# Patient Record
Sex: MALE | Race: WHITE | NOT HISPANIC OR LATINO | Employment: PART TIME | URBAN - METROPOLITAN AREA
[De-identification: names, ages, dates, MRNs, and addresses within clinical notes are randomized per-mention and may not be internally consistent; named-entity substitution may affect disease eponyms.]

---

## 2020-01-03 ENCOUNTER — HOSPITAL ENCOUNTER (INPATIENT)
Facility: CLINIC | Age: 25
LOS: 2 days | Discharge: HOME | End: 2020-01-05
Attending: EMERGENCY MEDICINE | Admitting: PSYCHIATRY & NEUROLOGY
Payer: COMMERCIAL

## 2020-01-03 DIAGNOSIS — F10.920 ACUTE ALCOHOLIC INTOXICATION WITHOUT COMPLICATION (H): ICD-10-CM

## 2020-01-03 DIAGNOSIS — J02.9 ACUTE PHARYNGITIS, UNSPECIFIED ETIOLOGY: ICD-10-CM

## 2020-01-03 PROBLEM — F10.939 ALCOHOL WITHDRAWAL SYNDROME, WITH UNSPECIFIED COMPLICATION (H): Status: ACTIVE | Noted: 2020-01-03

## 2020-01-03 LAB
ALBUMIN SERPL-MCNC: 4 G/DL (ref 3.4–5)
ALCOHOL BREATH TEST: 0.24 (ref 0–0.01)
ALP SERPL-CCNC: 74 U/L (ref 40–150)
ALT SERPL W P-5'-P-CCNC: 78 U/L (ref 0–70)
AMPHETAMINES UR QL SCN: NEGATIVE
ANION GAP SERPL CALCULATED.3IONS-SCNC: 11 MMOL/L (ref 3–14)
AST SERPL W P-5'-P-CCNC: 111 U/L (ref 0–45)
BARBITURATES UR QL: NEGATIVE
BASOPHILS # BLD AUTO: 0 10E9/L (ref 0–0.2)
BASOPHILS NFR BLD AUTO: 0.5 %
BENZODIAZ UR QL: NEGATIVE
BILIRUB SERPL-MCNC: 0.3 MG/DL (ref 0.2–1.3)
BUN SERPL-MCNC: 10 MG/DL (ref 7–30)
CALCIUM SERPL-MCNC: 8.2 MG/DL (ref 8.5–10.1)
CANNABINOIDS UR QL SCN: NEGATIVE
CHLORIDE SERPL-SCNC: 101 MMOL/L (ref 94–109)
CO2 SERPL-SCNC: 28 MMOL/L (ref 20–32)
COCAINE UR QL: NEGATIVE
CREAT SERPL-MCNC: 0.66 MG/DL (ref 0.66–1.25)
DEPRECATED S PYO AG THROAT QL EIA: NORMAL
DIFFERENTIAL METHOD BLD: ABNORMAL
EOSINOPHIL # BLD AUTO: 0 10E9/L (ref 0–0.7)
EOSINOPHIL NFR BLD AUTO: 0.3 %
ERYTHROCYTE [DISTWIDTH] IN BLOOD BY AUTOMATED COUNT: 14.4 % (ref 10–15)
ETHANOL UR QL SCN: POSITIVE
GFR SERPL CREATININE-BSD FRML MDRD: >90 ML/MIN/{1.73_M2}
GLUCOSE SERPL-MCNC: 172 MG/DL (ref 70–99)
HCT VFR BLD AUTO: 40.7 % (ref 40–53)
HGB BLD-MCNC: 13.2 G/DL (ref 13.3–17.7)
IMM GRANULOCYTES # BLD: 0 10E9/L (ref 0–0.4)
IMM GRANULOCYTES NFR BLD: 0 %
LYMPHOCYTES # BLD AUTO: 0.9 10E9/L (ref 0.8–5.3)
LYMPHOCYTES NFR BLD AUTO: 21.6 %
MCH RBC QN AUTO: 28.3 PG (ref 26.5–33)
MCHC RBC AUTO-ENTMCNC: 32.4 G/DL (ref 31.5–36.5)
MCV RBC AUTO: 87 FL (ref 78–100)
MONOCYTES # BLD AUTO: 0.4 10E9/L (ref 0–1.3)
MONOCYTES NFR BLD AUTO: 9 %
NEUTROPHILS # BLD AUTO: 2.7 10E9/L (ref 1.6–8.3)
NEUTROPHILS NFR BLD AUTO: 68.6 %
NRBC # BLD AUTO: 0 10*3/UL
NRBC BLD AUTO-RTO: 0 /100
OPIATES UR QL SCN: NEGATIVE
PLATELET # BLD AUTO: 157 10E9/L (ref 150–450)
POTASSIUM SERPL-SCNC: 3.6 MMOL/L (ref 3.4–5.3)
PROT SERPL-MCNC: 7.6 G/DL (ref 6.8–8.8)
RBC # BLD AUTO: 4.66 10E12/L (ref 4.4–5.9)
SODIUM SERPL-SCNC: 140 MMOL/L (ref 133–144)
SPECIMEN SOURCE: NORMAL
WBC # BLD AUTO: 4 10E9/L (ref 4–11)

## 2020-01-03 PROCEDURE — 87880 STREP A ASSAY W/OPTIC: CPT | Performed by: FAMILY MEDICINE

## 2020-01-03 PROCEDURE — 25000128 H RX IP 250 OP 636: Performed by: NURSE PRACTITIONER

## 2020-01-03 PROCEDURE — 80320 DRUG SCREEN QUANTALCOHOLS: CPT | Performed by: FAMILY MEDICINE

## 2020-01-03 PROCEDURE — 80307 DRUG TEST PRSMV CHEM ANLYZR: CPT | Performed by: FAMILY MEDICINE

## 2020-01-03 PROCEDURE — 85025 COMPLETE CBC W/AUTO DIFF WBC: CPT | Performed by: FAMILY MEDICINE

## 2020-01-03 PROCEDURE — 99282 EMERGENCY DEPT VISIT SF MDM: CPT | Mod: Z6 | Performed by: EMERGENCY MEDICINE

## 2020-01-03 PROCEDURE — 12800008 ZZH R&B CD ADULT

## 2020-01-03 PROCEDURE — 80053 COMPREHEN METABOLIC PANEL: CPT | Performed by: FAMILY MEDICINE

## 2020-01-03 PROCEDURE — HZ2ZZZZ DETOXIFICATION SERVICES FOR SUBSTANCE ABUSE TREATMENT: ICD-10-PCS | Performed by: EMERGENCY MEDICINE

## 2020-01-03 PROCEDURE — 25000132 ZZH RX MED GY IP 250 OP 250 PS 637: Performed by: EMERGENCY MEDICINE

## 2020-01-03 PROCEDURE — 87081 CULTURE SCREEN ONLY: CPT | Performed by: FAMILY MEDICINE

## 2020-01-03 PROCEDURE — 99283 EMERGENCY DEPT VISIT LOW MDM: CPT | Performed by: EMERGENCY MEDICINE

## 2020-01-03 PROCEDURE — 82075 ASSAY OF BREATH ETHANOL: CPT | Performed by: EMERGENCY MEDICINE

## 2020-01-03 PROCEDURE — 25000132 ZZH RX MED GY IP 250 OP 250 PS 637: Performed by: NURSE PRACTITIONER

## 2020-01-03 RX ORDER — FOLIC ACID 1 MG/1
1 TABLET ORAL DAILY
Status: DISCONTINUED | OUTPATIENT
Start: 2020-01-04 | End: 2020-01-05 | Stop reason: HOSPADM

## 2020-01-03 RX ORDER — LANOLIN ALCOHOL/MO/W.PET/CERES
100 CREAM (GRAM) TOPICAL DAILY
Status: DISCONTINUED | OUTPATIENT
Start: 2020-01-04 | End: 2020-01-05 | Stop reason: HOSPADM

## 2020-01-03 RX ORDER — ATENOLOL 50 MG/1
50 TABLET ORAL DAILY PRN
Status: DISCONTINUED | OUTPATIENT
Start: 2020-01-03 | End: 2020-01-05 | Stop reason: HOSPADM

## 2020-01-03 RX ORDER — TRAZODONE HYDROCHLORIDE 50 MG/1
50 TABLET, FILM COATED ORAL
Status: DISCONTINUED | OUTPATIENT
Start: 2020-01-03 | End: 2020-01-05 | Stop reason: HOSPADM

## 2020-01-03 RX ORDER — ONDANSETRON 4 MG/1
4 TABLET, ORALLY DISINTEGRATING ORAL EVERY 6 HOURS PRN
Status: DISCONTINUED | OUTPATIENT
Start: 2020-01-03 | End: 2020-01-05 | Stop reason: HOSPADM

## 2020-01-03 RX ORDER — ALUMINA, MAGNESIA, AND SIMETHICONE 2400; 2400; 240 MG/30ML; MG/30ML; MG/30ML
30 SUSPENSION ORAL EVERY 4 HOURS PRN
Status: DISCONTINUED | OUTPATIENT
Start: 2020-01-03 | End: 2020-01-05 | Stop reason: HOSPADM

## 2020-01-03 RX ORDER — BISACODYL 10 MG
10 SUPPOSITORY, RECTAL RECTAL DAILY PRN
Status: DISCONTINUED | OUTPATIENT
Start: 2020-01-03 | End: 2020-01-05 | Stop reason: HOSPADM

## 2020-01-03 RX ORDER — MULTIPLE VITAMINS W/ MINERALS TAB 9MG-400MCG
1 TAB ORAL DAILY
Status: DISCONTINUED | OUTPATIENT
Start: 2020-01-04 | End: 2020-01-05 | Stop reason: HOSPADM

## 2020-01-03 RX ORDER — IBUPROFEN 600 MG/1
600 TABLET, FILM COATED ORAL EVERY 6 HOURS PRN
Status: DISCONTINUED | OUTPATIENT
Start: 2020-01-03 | End: 2020-01-05 | Stop reason: HOSPADM

## 2020-01-03 RX ORDER — NICOTINE 21 MG/24HR
1 PATCH, TRANSDERMAL 24 HOURS TRANSDERMAL DAILY
Status: DISCONTINUED | OUTPATIENT
Start: 2020-01-04 | End: 2020-01-05 | Stop reason: HOSPADM

## 2020-01-03 RX ORDER — DIAZEPAM 5 MG
5-20 TABLET ORAL EVERY 30 MIN PRN
Status: DISCONTINUED | OUTPATIENT
Start: 2020-01-03 | End: 2020-01-05 | Stop reason: HOSPADM

## 2020-01-03 RX ORDER — NICOTINE 21 MG/24HR
1 PATCH, TRANSDERMAL 24 HOURS TRANSDERMAL ONCE
Status: COMPLETED | OUTPATIENT
Start: 2020-01-03 | End: 2020-01-04

## 2020-01-03 RX ORDER — HYDROXYZINE HYDROCHLORIDE 25 MG/1
25 TABLET, FILM COATED ORAL EVERY 4 HOURS PRN
Status: DISCONTINUED | OUTPATIENT
Start: 2020-01-03 | End: 2020-01-05 | Stop reason: HOSPADM

## 2020-01-03 RX ADMIN — TRAZODONE HYDROCHLORIDE 50 MG: 50 TABLET ORAL at 22:09

## 2020-01-03 RX ADMIN — NICOTINE 1 PATCH: 21 PATCH, EXTENDED RELEASE TRANSDERMAL at 16:38

## 2020-01-03 RX ADMIN — DIAZEPAM 10 MG: 5 TABLET ORAL at 20:43

## 2020-01-03 RX ADMIN — ONDANSETRON 4 MG: 4 TABLET, ORALLY DISINTEGRATING ORAL at 22:10

## 2020-01-03 RX ADMIN — HYDROXYZINE HYDROCHLORIDE 25 MG: 25 TABLET, FILM COATED ORAL at 19:01

## 2020-01-03 RX ADMIN — DIAZEPAM 10 MG: 5 TABLET ORAL at 19:01

## 2020-01-03 ASSESSMENT — ACTIVITIES OF DAILY LIVING (ADL)
FALL_HISTORY_WITHIN_LAST_SIX_MONTHS: YES
BATHING: 0-->INDEPENDENT
COGNITION: 0 - NO COGNITION ISSUES REPORTED
AMBULATION: 0-->INDEPENDENT
RETIRED_COMMUNICATION: 0-->UNDERSTANDS/COMMUNICATES WITHOUT DIFFICULTY
DRESS: 0-->INDEPENDENT
SWALLOWING: 0-->SWALLOWS FOODS/LIQUIDS WITHOUT DIFFICULTY
TOILETING: 0-->INDEPENDENT
NUMBER_OF_TIMES_PATIENT_HAS_FALLEN_WITHIN_LAST_SIX_MONTHS: 2
RETIRED_EATING: 0-->INDEPENDENT
TRANSFERRING: 0-->INDEPENDENT

## 2020-01-03 ASSESSMENT — MIFFLIN-ST. JEOR: SCORE: 1545.57

## 2020-01-03 NOTE — ED PROVIDER NOTES
History     Chief Complaint   Patient presents with     Withdrawal     seeking detox from alcohol     HPI  Manjeet Canales is a 24 year old male who presents for alcohol detox.  He last drank several hours prior to presentation.  He drinks 1 to 2 pints of liquor a day.  He has not had any previous professional detox but states in the past he tried to do this by himself at home.  He has been drinking for 6 months.  Denies any physical complaints at this time.    I have reviewed the Medications, Allergies, Past Medical and Surgical History, and Social History in the Epic system.    Review of Systems   All other systems reviewed and are negative.      Physical Exam   BP: 136/88  Pulse: 112  Temp: 98.2  F (36.8  C)  Resp: 14  Weight: 57.2 kg (126 lb)  SpO2: 98 %      Physical Exam  Constitutional:       Appearance: Normal appearance.   HENT:      Head: Normocephalic and atraumatic.      Mouth/Throat:      Mouth: Mucous membranes are moist.   Eyes:      Extraocular Movements: Extraocular movements intact.   Neck:      Musculoskeletal: Normal range of motion.   Cardiovascular:      Rate and Rhythm: Tachycardia present.   Pulmonary:      Effort: Pulmonary effort is normal.      Breath sounds: Normal breath sounds.   Abdominal:      General: Abdomen is flat.      Palpations: Abdomen is soft.      Tenderness: There is no abdominal tenderness.   Musculoskeletal: Normal range of motion.   Skin:     General: Skin is warm.      Capillary Refill: Capillary refill takes less than 2 seconds.   Neurological:      General: No focal deficit present.      Mental Status: He is alert and oriented to person, place, and time.         ED Course        Procedures             Labs Ordered and Resulted from Time of ED Arrival Up to the Time of Departure from the ED   DRUG ABUSE SCREEN 6 CHEM DEP URINE ([facility]) - Abnormal; Notable for the following components:       Result Value    Ethanol Qual Urine Positive (*)     All other  components within normal limits   CBC WITH PLATELETS DIFFERENTIAL - Abnormal; Notable for the following components:    Hemoglobin 13.2 (*)     All other components within normal limits   COMPREHENSIVE METABOLIC PANEL - Abnormal; Notable for the following components:    Glucose 172 (*)     Calcium 8.2 (*)     ALT 78 (*)      (*)     All other components within normal limits   ALCOHOL BREATH TEST POCT - Abnormal; Notable for the following components:    Alcohol Breath Test 0.242 (*)     All other components within normal limits            Assessments & Plan (with Medical Decision Making)   Patient presents for alcohol detox.  Currently stable.  There is a bed awaiting the patient.  Patient will be admitted for further treatment.  Medically stable.  Patient did complain of a sore throat so rapid strep was performed and this was negative.  Patient will be admitted for further alcohol detox treatment    I have reviewed the nursing notes.    I have reviewed the findings, diagnosis, plan and need for follow up with the patient.    New Prescriptions    No medications on file       Final diagnoses:   Acute alcoholic intoxication without complication (H)       1/3/2020   [facility], [place], EMERGENCY DEPARTMENT     Ivan Tenorio MD  01/03/20 0524

## 2020-01-03 NOTE — PHARMACY-ADMISSION MEDICATION HISTORY
Admission Medication History Completed by Pharmacy    Sources used to verify prior to admission medications:   - Patient interview    Pertinent Medication Changes:  No changes    Additional Information:   Patient denies being prescribed any medications or taking any over-the-counter (OTC) products such as vitamins, supplements, sleep aids, etc.      Prior to Admission Medication List:  None     Time spent: 5 minutes  -----------  Carrie Herbert PharmRupinderDRupinder, [facility]  Behavioral Health Pharmacist  [facility])  Ascom: *[id number] or [phone] (1pm to 9pm daily)

## 2020-01-04 LAB
GGT SERPL-CCNC: 80 U/L (ref 0–75)
MAGNESIUM SERPL-MCNC: 1.6 MG/DL (ref 1.6–2.3)
PHOSPHATE SERPL-MCNC: 4.7 MG/DL (ref 2.5–4.5)
TSH SERPL DL<=0.005 MIU/L-ACNC: 1.32 MU/L (ref 0.4–4)
VIT B12 SERPL-MCNC: 450 PG/ML (ref 193–986)

## 2020-01-04 PROCEDURE — 36415 COLL VENOUS BLD VENIPUNCTURE: CPT | Performed by: PSYCHIATRY & NEUROLOGY

## 2020-01-04 PROCEDURE — 25000132 ZZH RX MED GY IP 250 OP 250 PS 637: Performed by: NURSE PRACTITIONER

## 2020-01-04 PROCEDURE — 83735 ASSAY OF MAGNESIUM: CPT | Performed by: PSYCHIATRY & NEUROLOGY

## 2020-01-04 PROCEDURE — 99207 ZZC CDG-HISTORY COMP: MEETS EXP. PROBLEM FOCUSED - DOWN CODED LACK OF HPI: CPT | Performed by: PHYSICIAN ASSISTANT

## 2020-01-04 PROCEDURE — 82977 ASSAY OF GGT: CPT | Performed by: PSYCHIATRY & NEUROLOGY

## 2020-01-04 PROCEDURE — 84100 ASSAY OF PHOSPHORUS: CPT | Performed by: PSYCHIATRY & NEUROLOGY

## 2020-01-04 PROCEDURE — 99221 1ST HOSP IP/OBS SF/LOW 40: CPT | Mod: AI | Performed by: PSYCHIATRY & NEUROLOGY

## 2020-01-04 PROCEDURE — 84443 ASSAY THYROID STIM HORMONE: CPT | Performed by: PSYCHIATRY & NEUROLOGY

## 2020-01-04 PROCEDURE — 83735 ASSAY OF MAGNESIUM: CPT | Performed by: PHYSICIAN ASSISTANT

## 2020-01-04 PROCEDURE — 99207 ZZC CDG-HISTORY COMP: MEETS EXP. PROB FOCUSED- DOWN CODED LACK OF PFSH: CPT | Performed by: PSYCHIATRY & NEUROLOGY

## 2020-01-04 PROCEDURE — 12800008 ZZH R&B CD ADULT

## 2020-01-04 PROCEDURE — 82607 VITAMIN B-12: CPT | Performed by: PSYCHIATRY & NEUROLOGY

## 2020-01-04 PROCEDURE — 99231 SBSQ HOSP IP/OBS SF/LOW 25: CPT | Performed by: PHYSICIAN ASSISTANT

## 2020-01-04 RX ADMIN — IBUPROFEN 600 MG: 600 TABLET ORAL at 22:25

## 2020-01-04 RX ADMIN — DIAZEPAM 10 MG: 5 TABLET ORAL at 09:01

## 2020-01-04 RX ADMIN — MULTIPLE VITAMINS W/ MINERALS TAB 1 TABLET: TAB at 09:01

## 2020-01-04 RX ADMIN — MAGNESIUM HYDROXIDE 30 ML: 400 SUSPENSION ORAL at 14:08

## 2020-01-04 RX ADMIN — FOLIC ACID 1 MG: 1 TABLET ORAL at 09:01

## 2020-01-04 RX ADMIN — DIAZEPAM 10 MG: 5 TABLET ORAL at 00:55

## 2020-01-04 RX ADMIN — HYDROXYZINE HYDROCHLORIDE 25 MG: 25 TABLET, FILM COATED ORAL at 18:12

## 2020-01-04 RX ADMIN — DIAZEPAM 10 MG: 5 TABLET ORAL at 16:09

## 2020-01-04 RX ADMIN — THIAMINE HCL TAB 100 MG 100 MG: 100 TAB at 09:01

## 2020-01-04 RX ADMIN — TRAZODONE HYDROCHLORIDE 50 MG: 50 TABLET ORAL at 22:25

## 2020-01-04 RX ADMIN — DIAZEPAM 10 MG: 5 TABLET ORAL at 11:58

## 2020-01-04 RX ADMIN — NICOTINE 1 PATCH: 21 PATCH, EXTENDED RELEASE TRANSDERMAL at 09:01

## 2020-01-04 RX ADMIN — HYDROXYZINE HYDROCHLORIDE 25 MG: 25 TABLET, FILM COATED ORAL at 22:25

## 2020-01-04 ASSESSMENT — ACTIVITIES OF DAILY LIVING (ADL)
DRESS: INDEPENDENT
ORAL_HYGIENE: INDEPENDENT
HYGIENE/GROOMING: INDEPENDENT

## 2020-01-04 NOTE — CONSULTS
[facility], [place]  Consult Note - Hospitalist Service     Date of Admission:  1/3/2020  Consult Requested by: Karl Peña CNP  Reason for Consult: Medical co-management    Assessment & Plan   Manjeet Canales is a 24 year old male with a history of alcohol abuse and methamphetamine abuse who was admitted to station 3A seeking detoxification from alcohol.    Alcohol dependence: Drinking 1-2 pints per day. Last drink yesterday morning. Utox in the ED + for ethanol, SKIP 0.242.  No history of withdrawal seizures and DTs. MSSA currently 10.   - Continue on MSSA protocol with benzodiazepines as indicated   - Management per Psychiatry   - Agree with MVI, folic acid, and thiamine supplements   - Notify medicine for SBP >180 or DBP >110    Transaminitis: ALT 78, , alk phos and TBili wnl. Most c/w alcohol abuse. No prior labs available for comparison.   - He should have LFTs rechecked in 1-2 weeks with PCP    Sore throat: Much improved today. Rapid strep negative.    - Continue to monitor   - Notify Medicine for any worsening or new fever   - Ibuprofen JOCE Tompkins PA-C  [facility], [place]  Hospitalist Service  Pager: [phone]    ______________________________________________________________________    Chief Complaint   Alcohol withdrawal    History is obtained from the patient    History of Present Illness   Manjeet Canales is a 24 year old male with a history of alcohol abuse and methamphetamine abuse who was admitted to station 3A seeking detoxification from alcohol. He has been drinking about 1-2 pints of liquor daily for the last 6 months. Last drank yesterday morning. Smoking marijuana occasionally. No other substance use. No hx of withdrawal seizures. In 2/2019 he was admitted to OSH ICU for severe alcohol withdrawal with alcoholic hallucinosis requiring precedex gtt, also was intoxicated with  methamphetamines at the time.     Currently is feeling tired and tremulous. Is hoping to discharge tomorrow. Eating and drinking well. Has had a sore throat for a few days, is feeling better today. Denies any cough, nasal congestion, rhinorrhea, throat swelling, fever/chills, dyspnea. No dysphagia or throat swelling.     He is otherwise healthy with no ongoing medical problems. No history of cardiovascular or pulmonary disease. Is not diabetic.       Review of Systems   The 10 point Review of Systems is negative other than noted in the HPI or here.     Past Medical History    I have reviewed this patient's medical history and updated it with pertinent information if needed.   Past Medical History:   Diagnosis Date     Alcohol abuse        Past Surgical History   I have reviewed this patient's surgical history and updated it with pertinent information if needed.  History reviewed. No pertinent surgical history.    Social History   I have reviewed this patient's social history and updated it with pertinent information if needed.  Social History     Tobacco Use     Smoking status: Current Every Day Smoker     Smokeless tobacco: Never Used   Substance Use Topics     Alcohol use: Yes     Drug use: Never       Family History   I have reviewed this patient's family history and updated it with pertinent information if needed.   No family history on file.    Medications   Current Facility-Administered Medications   Medication     alum & mag hydroxide-simethicone (MYLANTA ES/MAALOX  ES) suspension 30 mL     atenolol (TENORMIN) tablet 50 mg     bisacodyl (DULCOLAX) Suppository 10 mg     diazepam (VALIUM) tablet 5-20 mg     folic acid (FOLVITE) tablet 1 mg     hydrOXYzine (ATARAX) tablet 25 mg     ibuprofen (ADVIL/MOTRIN) tablet 600 mg     influenza quadrivalent (PF) vacc (FLUZONE) injection 0.5 mL     magnesium hydroxide (MILK OF MAGNESIA) suspension 30 mL     multivitamin w/minerals (THERA-VIT-M) tablet 1 tablet     nicotine  (NICODERM CQ) 21 MG/24HR 24 hr patch 1 patch     nicotine (NICODERM CQ) 21 MG/24HR 24 hr patch 1 patch     nicotine Patch in Place     ondansetron (ZOFRAN-ODT) ODT tab 4 mg     traZODone (DESYREL) tablet 50 mg     vitamin B1 (THIAMINE) tablet 100 mg       Allergies   No Known Allergies    Physical Exam   Vital Signs: Temp: 97.4  F (36.3  C) Temp src: Oral BP: (!) 135/96 Pulse: 81   Resp: 16 SpO2: 98 % O2 Device: None (Room air)    Weight: 127 lbs 0 oz    Constitutional: Awake and alert, in no apparent distress.   Eyes: Sclera clear, anicteric   ENT: Oropharynx clear without exudates or erythema  Respiratory: Breathing comfortably on room air. Clear to auscultation bilaterally with no crackles, wheezing, or rhonchi. Good air entry throughout.   Cardiovascular:  RRR, normal S1/S2. No rubs or murmurs.   GI: Soft, non-tender, non-distended. Bowel sounds present.   Skin:  Good color. No jaundice. No visible rashes, lesions, or bruising of concern.   Neurologic: Alert and fully oriented. Fine resting tremor of bilateral UEs. No focal deficits.     Data   ROUTINE IP LABS (Last four results)  Recent Labs   Lab 01/03/20  1244      POTASSIUM 3.6   CHLORIDE 101   CO2 28   ANIONGAP 11   *   BUN 10   CR 0.66   SULEMA 8.2*   PROTTOTAL 7.6   ALBUMIN 4.0   BILITOTAL 0.3   ALKPHOS 74   *   ALT 78*     Recent Labs   Lab 01/03/20  1244   WBC 4.0   RBC 4.66   HGB 13.2*   HCT 40.7   MCV 87   MCH 28.3   MCHC 32.4   RDW 14.4        No lab results found in last 7 days.

## 2020-01-04 NOTE — PROGRESS NOTES
01/03/20 1823   Patient Belongings   Did you bring any home meds/supplements to the hospital?  No   Patient Belongings other (see comments)   Patient Belongings Put in Hospital Secure Location (Security or Locker, etc.) other (see comments)   Belongings Search Yes   Clothing Search Yes   Second Staff Dion   Comment See Notes     Large Bin   Jacket, backpack w/shorts and sweat pants w/strings, sharps(E cig,keys), boots belt, winter hat, scalf  Small bin:  Wallet, phone  Security env # [id number]    Visa card-[id number]     Mn Id   A               Admission:  I am responsible for any personal items that are not sent to the safe or pharmacy.  [facility] is not responsible for loss, theft or damage of any property in my possession.    Signature:  _________________________________ Date: _______  Time: _____                                              Staff Signature:  ____________________________ Date: ________  Time: _____      2nd Staff person, if patient is unable/unwilling to sign:    Signature: ________________________________ Date: ________  Time: _____     Discharge:  [facility] has returned all of my personal belongings:    Signature: _________________________________ Date: ________  Time: _____                                          Staff Signature:  ____________________________ Date: ________  Time: _____

## 2020-01-05 VITALS
HEIGHT: 68 IN | TEMPERATURE: 98.2 F | RESPIRATION RATE: 16 BRPM | HEART RATE: 96 BPM | WEIGHT: 127 LBS | OXYGEN SATURATION: 100 % | BODY MASS INDEX: 19.25 KG/M2 | SYSTOLIC BLOOD PRESSURE: 145 MMHG | DIASTOLIC BLOOD PRESSURE: 89 MMHG

## 2020-01-05 LAB
BACTERIA SPEC CULT: NORMAL
Lab: NORMAL
SPECIMEN SOURCE: NORMAL

## 2020-01-05 PROCEDURE — 25000128 H RX IP 250 OP 636: Performed by: PSYCHIATRY & NEUROLOGY

## 2020-01-05 PROCEDURE — 90686 IIV4 VACC NO PRSV 0.5 ML IM: CPT | Performed by: PSYCHIATRY & NEUROLOGY

## 2020-01-05 PROCEDURE — 25000132 ZZH RX MED GY IP 250 OP 250 PS 637: Performed by: NURSE PRACTITIONER

## 2020-01-05 RX ADMIN — IBUPROFEN 600 MG: 600 TABLET ORAL at 10:24

## 2020-01-05 RX ADMIN — INFLUENZA A VIRUS A/BRISBANE/02/2018 IVR-190 (H1N1) ANTIGEN (FORMALDEHYDE INACTIVATED), INFLUENZA A VIRUS A/KANSAS/14/2017 X-327 (H3N2) ANTIGEN (FORMALDEHYDE INACTIVATED), INFLUENZA B VIRUS B/PHUKET/3073/2013 ANTIGEN (FORMALDEHYDE INACTIVATED), AND INFLUENZA B VIRUS B/MARYLAND/15/2016 BX-69A ANTIGEN (FORMALDEHYDE INACTIVATED) 0.5 ML: 15; 15; 15; 15 INJECTION, SUSPENSION INTRAMUSCULAR at 12:25

## 2020-01-05 RX ADMIN — MULTIPLE VITAMINS W/ MINERALS TAB 1 TABLET: TAB at 08:16

## 2020-01-05 RX ADMIN — NICOTINE 1 PATCH: 21 PATCH, EXTENDED RELEASE TRANSDERMAL at 08:18

## 2020-01-05 RX ADMIN — FOLIC ACID 1 MG: 1 TABLET ORAL at 08:16

## 2020-01-05 RX ADMIN — THIAMINE HCL TAB 100 MG 100 MG: 100 TAB at 08:16

## 2020-01-05 NOTE — DISCHARGE INSTRUCTIONS
Behavioral Discharge Planning and Instructions  THANK YOU FOR CHOOSING THE [facility]  3A  [phone]    Summary: You were admitted to Station 3A on 1/3/2020 for detoxification from alcohol.  A medical exam was performed that included lab work.  Please take care and make your recovery a priority!    Main Diagnosis: Alcohol use disorder    Disposition: home    Major Treatments, Procedures and Findings:  You were detoxed from alcohol  using [facility] protocol.  You have had labs drawn and those results have been reviewed with you. Please take a copy of your lab work with you to your next primary care physician appointment.    Primary Provider:  Please follow up with primary MD  within 30 days for post hospitalization checkup.    Symptoms to Report:  If you experience more anxiety, confusion, sleeplessness, deep sadness or thoughts of suicide, notify your treatment team or notify your primary care physician. IF ANY OF THE SYMPTOMS YOU ARE EXPERIENCING ARE A MEDICAL EMERGENCY CALL 911 IMMEDIATELY.     Lifestyle Adjustment: Adjust your lifestyle to get enough sleep, relaxation, exercise and  good nutrition. Continue to develop healthy coping skills to decrease stress and promote a sober living environment. Do not use alcohol, illegal drugs or addictive medications other than what is currently prescribed. AA, NA, and  Sponsor are excellent resources for support.     General Medication Instructions:   See your medication sheet(s) for instructions.   Take all medicines as directed.  Make no changes unless your doctor suggests them.   Do not use any drugs not prescribed by your provider.    Avoid alcohol.    DISCHARGE RESOURCES:  -SMART Recovery - self management for addiction recovery:  www.smartrecovery.org    -Pathways ~ A Health Crisis Resource & Support Center: 359.346.5202.  -[facility] 546-774-7838   -[facility] Behavioral Intake 642-886-6796 or  378.998.8500.  -Suicide Awareness Voices of Education (SAVE) (www.save.org): 131-350-VXJY (1160)  -National Suicide Prevention Line (www.mentalhealthmn.org): 376-857-UHBX (7265)  -National [place] on Mental Illness (www.mn.kaya.org): 302.793.8462 or 851-199-8257.  -Pjda8lgru: text the word LIFE to 92895 for immediate support and crisis intervention  -Mental Health Consumer/Survivor Network of [place] (www.mhcsn.net): 306.295.2150 or 767-925-6060  -Mental Health Association of [place] (www.mentalhealth.org): 592.855.9281 or 632-327-8363     -Substance Abuse and Mental Health Services (www.samhsa.gov)  -Harm Reduction Coalition (www. Harmreduction.org)  -www.prescribetoprevent.org or http://prescribetoprevent.org/video  -Poison control 5-387-185-8494   **[place] Opioid Prevention Coalition: www.opioidcoalition.org    Sober Support Group Information:  AA/NA & Sponsor/Support  -Alcoholics Anonymous (www.alcoholics-anonymous.org): for local information 24 hours/day  -AA Intergroup service office in [place] (http://www.aastpaul.org/) 568.584.7229  -AA Intergroup service office in [facility]: 671.980.3248. (http://www.a[place].org/)  -Narcotics Anonymous (www.nam[place].org) (901) 102-4834   **Sober Fun Activities: www.sober-activities.[place] Panels/cities//[facility] Recovery Connection (MRC)  [facility] connects people seeking recovery to resources that help foster and sustain long-term recovery.  Whether you are seeking resources for treatment, transportation, housing, job training, education, health care or other pathways to recovery, [facility] is a great place to start.    Phone: 632.897.9026.  www.[place]OneEyeAnt.NuMedii (Great listing of all types of recovery and non-recovery related resources)    Any follow up concerns:  Nursing questions call the Unit 3A-[facility] [phone]  Medical Record call [phone]  Outpatient Behavioral Intake call [phone]  LP+ Wait List/Bed Availability call  [phone]    The entire treatment team has appreciated the opportunity to work with you.  We wish you the best in the future and with your lifelong recovery goals. Please bring this discharge folder with you to all follow up appointments.  It contains your lab results, diagnosis, medication list and discharge recommendations.    THANK YOU FOR CHOOSING THE [facility]

## 2020-01-05 NOTE — PROGRESS NOTES
Pt verbalized complete understanding of all discharge instructions. Pt discharged to home transported by his mother.

## 2020-01-05 NOTE — PROGRESS NOTES
Pt has been asking if/when he can leave at each check-in. Pt has been informed the provider makes the decision on discharge. Pt is aware of needing to be OOD for hours.     Pt MSSA =  9, 5... Pt did not receive valium at 20:00 check-in and immediately asked if he will be leaving Sunday night.

## 2020-01-05 NOTE — H&P
[facility], [place]   Detox Admit   Admission date: 1/3/2020  Seen on: 1/4/2020            HPI:     Manjeet is a 24 year old male with history of alcohol use disorder who is admitted to detox from alcohol. He mentions that he drinks about 1-2 pints of vodka per day for the past 6 months. He's tried to detox himself at home, but unsuccessfully, and mentions this is his first detox admit. BAL was 0.242 on admission. Denies w/o seizures. Currently has tremor, nausea, mild dizziness. Is open to CD treatment going forward.         Past Psychiatric History:   Denies past psych history         Substance Use and History:     Recurrent alcohol use, denies other illicit drugs. Never been to detox before. Utox positive only for ETOH.         Past Medical History:   PAST MEDICAL HISTORY:   Past Medical History:   Diagnosis Date     Alcohol abuse        PAST SURGICAL HISTORY: History reviewed. No pertinent surgical history.          Family History:   FAMILY HISTORY: No family history on file.        Social History:   SOCIAL HISTORY:   Social History     Tobacco Use     Smoking status: Current Every Day Smoker     Smokeless tobacco: Never Used   Substance Use Topics     Alcohol use: Yes            Physical ROS:   The patient endorsed tremor, nausea, mild dizziness. The remainder of 10-point review of systems was negative except as noted in HPI.         PTA Medications:     No medications prior to admission.          Allergies:   No Known Allergies       Labs:     Recent Results (from the past 48 hour(s))   Alcohol breath test POCT    Collection Time: 01/03/20 12:11 PM   Result Value Ref Range    Alcohol Breath Test 0.242 (A) 0.00 - 0.01   Drug abuse screen 6 urine (tox)    Collection Time: 01/03/20 12:25 PM   Result Value Ref Range    Amphetamine Qual Urine Negative NEG^Negative    Barbiturates Qual Urine Negative NEG^Negative    Benzodiazepine Qual Urine Negative NEG^Negative    Cannabinoids Qual  Urine Negative NEG^Negative    Cocaine Qual Urine Negative NEG^Negative    Ethanol Qual Urine Positive (A) NEG^Negative    Opiates Qualitative Urine Negative NEG^Negative   CBC with platelets differential    Collection Time: 01/03/20 12:44 PM   Result Value Ref Range    WBC 4.0 4.0 - 11.0 10e9/L    RBC Count 4.66 4.4 - 5.9 10e12/L    Hemoglobin 13.2 (L) 13.3 - 17.7 g/dL    Hematocrit 40.7 40.0 - 53.0 %    MCV 87 78 - 100 fl    MCH 28.3 26.5 - 33.0 pg    MCHC 32.4 31.5 - 36.5 g/dL    RDW 14.4 10.0 - 15.0 %    Platelet Count 157 150 - 450 10e9/L    Diff Method Automated Method     % Neutrophils 68.6 %    % Lymphocytes 21.6 %    % Monocytes 9.0 %    % Eosinophils 0.3 %    % Basophils 0.5 %    % Immature Granulocytes 0.0 %    Nucleated RBCs 0 0 /100    Absolute Neutrophil 2.7 1.6 - 8.3 10e9/L    Absolute Lymphocytes 0.9 0.8 - 5.3 10e9/L    Absolute Monocytes 0.4 0.0 - 1.3 10e9/L    Absolute Eosinophils 0.0 0.0 - 0.7 10e9/L    Absolute Basophils 0.0 0.0 - 0.2 10e9/L    Abs Immature Granulocytes 0.0 0 - 0.4 10e9/L    Absolute Nucleated RBC 0.0    Comprehensive metabolic panel    Collection Time: 01/03/20 12:44 PM   Result Value Ref Range    Sodium 140 133 - 144 mmol/L    Potassium 3.6 3.4 - 5.3 mmol/L    Chloride 101 94 - 109 mmol/L    Carbon Dioxide 28 20 - 32 mmol/L    Anion Gap 11 3 - 14 mmol/L    Glucose 172 (H) 70 - 99 mg/dL    Urea Nitrogen 10 7 - 30 mg/dL    Creatinine 0.66 0.66 - 1.25 mg/dL    GFR Estimate >90 >60 mL/min/[1.73_m2]    GFR Estimate If Black >90 >60 mL/min/[1.73_m2]    Calcium 8.2 (L) 8.5 - 10.1 mg/dL    Bilirubin Total 0.3 0.2 - 1.3 mg/dL    Albumin 4.0 3.4 - 5.0 g/dL    Protein Total 7.6 6.8 - 8.8 g/dL    Alkaline Phosphatase 74 40 - 150 U/L    ALT 78 (H) 0 - 70 U/L     (H) 0 - 45 U/L   Rapid strep screen    Collection Time: 01/03/20  3:45 PM   Result Value Ref Range    Specimen Description Throat     Rapid Strep A Screen       NEGATIVE: No Group A streptococcal antigen detected by  "immunoassay, await culture report.   Beta strep group A culture    Collection Time: 01/03/20  3:45 PM   Result Value Ref Range    Specimen Description Throat     Special Requests Specimen collected in eSwab transport (white cap)     Culture Micro       Negative after 24 hours, await final report at 48 hours.   GGT    Collection Time: 01/04/20  7:40 AM   Result Value Ref Range    GGT 80 (H) 0 - 75 U/L   TSH with free T4 reflex and/or T3 as indicated    Collection Time: 01/04/20  7:40 AM   Result Value Ref Range    TSH 1.32 0.40 - 4.00 mU/L   Vitamin B12    Collection Time: 01/04/20  7:40 AM   Result Value Ref Range    Vitamin B12 450 193 - 986 pg/mL   Magnesium    Collection Time: 01/04/20  7:40 AM   Result Value Ref Range    Magnesium 1.6 1.6 - 2.3 mg/dL   Phosphorus    Collection Time: 01/04/20  7:40 AM   Result Value Ref Range    Phosphorus 4.7 (H) 2.5 - 4.5 mg/dL          Physical and Psychiatric Examination:     /89   Pulse 99   Temp 97.5  F (36.4  C) (Oral)   Resp 16   Ht 1.727 m (5' 8\")   Wt 57.6 kg (127 lb)   SpO2 96%   BMI 19.31 kg/m    Weight is 127 lbs 0 oz  Body mass index is 19.31 kg/m .    Physical Exam:  I have reviewed the physical exam as documented by ED provider and agree with findings and assessment and have no additional findings to add at this time.    Mental Status Exam:  Appearance: awake, alert and dressed in hospital scrubs  Attitude:  cooperative  Eye Contact:  fair  Mood:  anxious  Affect:  appropriate and in normal range  Speech:  clear, coherent  Language: fluent and intact in English  Psychomotor, Gait, Musculoskeletal:  tremor observed   Throught Process:  logical, linear and goal oriented  Associations:  no loose associations  Thought Content:  no evidence of suicidal ideation or homicidal ideation, no evidence of psychotic thought, no auditory hallucinations present and no visual hallucinations present  Insight:  fair  Judgement:  fair  Oriented to:  time, person, and " place  Attention Span and Concentration:  fair  Recent and Remote Memory:  fair  Fund of Knowledge:  appropriate         Admission Diagnoses:   Alcohol Use Disorder, sever, complicated by withdrawal          Assessment & Plan:     Assessment:  Patient appears to have continued ETOH withdrawal receiving Valium as per protocol. Hopeful to go to CD treatment going forward.     Plan:  1.) Medication Plan:  - MSSA with Valium protocol    2.) Psychosocial Plan:  - CD assessment/treatment going forward     Legal:  Voluntary     Disposition:  Pending stability        Dave Cerna MD  [facility] Psychiatry

## 2020-01-19 NOTE — DISCHARGE SUMMARY
[facility]   Psychiatric Discharge Summary      Manjeet Canales MRN# [id number]   Age: 24 year old YOB: 1995     Date of Admission:  1/3/2020  Date of Discharge:  01/05/20  Admitting Physician:  Dave Cerna MD   Discharge Physician:  Dave Cerna MD         Summary/Hospital Course/Disposition:   Reason for Hospitalization: Manjeet is a 24 year old male with history of alcohol use disorder who is admitted to detox from alcohol. He mentions that he drinks about 1-2 pints of vodka per day for the past 6 months. He's tried to detox himself at home, but unsuccessfully, and mentions this is his first detox admit. BAL was 0.242 on admission. Denies w/o seizures. Currently has tremor, nausea, mild dizziness. Is open to CD treatment going forward.       Hospital Course:   Patient was treated for ETOH withdrawal via MSSA with Valium protocol. Withdrawal symptoms gradually abated. Patient was discharged.          DIagnoses:     Alcohol Use Disorder, sever, complicated by withdrawal        Labs:   No results found for this or any previous visit (from the past 24 hour(s)).         Consults:   [facility]  Consult Note - Hospitalist Service     Date of Admission:  1/3/2020  Consult Requested by: Karl Peña CNP  Reason for Consult: Medical co-management        Assessment & Plan     Manjeet Canales is a 24 year old male with a history of alcohol abuse and methamphetamine abuse who was admitted to station 3A seeking detoxification from alcohol.     Alcohol dependence: Drinking 1-2 pints per day. Last drink yesterday morning. Utox in the ED + for ethanol, SKIP 0.242.  No history of withdrawal seizures and DTs. MSSA currently 10.   - Continue on MSSA protocol with benzodiazepines as indicated   - Management per Psychiatry   - Agree with MVI, folic acid, and thiamine supplements   - Notify medicine for SBP >180  or DBP >110     Transaminitis: ALT 78, , alk phos and TBili wnl. Most c/w alcohol abuse. No prior labs available for comparison.   - He should have LFTs rechecked in 1-2 weeks with PCP     Sore throat: Much improved today. Rapid strep negative.    - Continue to monitor   - Notify Medicine for any worsening or new fever   - Ibuprofen PRN           Jamaica Tompkins PA-C  [facility]  Hospitalist Service  Pager: [phone]           Discharge Medications:        Review of your medicines      You have not been prescribed any medications.              Mental Status Examination:   Appearance: awake, alert and dressed in hospital scrubs  Attitude:  cooperative  Eye Contact:  fair  Mood:  anxious  Affect:  appropriate and in normal range  Speech:  clear, coherent  Language: fluent and intact in English  Psychomotor, Gait, Musculoskeletal: No tremor today   Throught Process:  logical, linear and goal oriented  Associations:  no loose associations  Thought Content:  no evidence of suicidal ideation or homicidal ideation, no evidence of psychotic thought, no auditory hallucinations present and no visual hallucinations present  Insight:  fair  Judgement:  fair  Oriented to:  time, person, and place  Attention Span and Concentration:  fair  Recent and Remote Memory:  fair  Fund of Knowledge:  appropriate         Discharge Plan:        Follow Up ([facility])    Follow-up with primary care provider in 1-2 weeks for recheck of liver function.       DISCHARGE RESOURCES:  -SMART Recovery - self management for addiction recovery:  www.smartrecovery.org    -Pathways ~ A Health Crisis Resource & Support Center: 963.740.8442.  -[facility] [phone]   -[facility] Behavioral Intake 355-301-7968 or 559-748-7241.  -Suicide Awareness Voices of Education (SAVE) (www.save.org): 572-487-KOQS (1591)  -National Suicide Prevention Line (www.mentalhealthmn.org):  711-823-TVPN (4372)  -National [place] on Mental Illness (www.mn.kaya.org): 523.612.4903 or 072-321-2584.  -Uevq3qlnw: text the word LIFE to 26790 for immediate support and crisis intervention  -Mental Health Consumer/Survivor Network of [place] (www.mhcsn.net): 827.563.2568 or 316-237-4196  -Mental Health Association of [place] (www.mentalhealth.org): 954.899.6774 or 091-031-5329     -Substance Abuse and Mental Health Services (www.samhsa.gov)  -Harm Reduction Coalition (www. Harmreduction.org)  -www.prescribetoprevent.org or http://prescribetoprevent.org/video  -Poison control 1-417-200-7301   **[place] Opioid Prevention Coalition: www.opioidcoalition.org     Sober Support Group Information:  AA/NA & Sponsor/Support  -Alcoholics Anonymous (www.alcoholics-anonymous.org): for local information 24 hours/day  -AA Intergroup service office in [place] (http://www.aastpaul.org/) 351.902.9522  -AA Intergroup service office in [facility]: 565.180.2104. (http://www.a[place].org/)  -Narcotics Anonymous (www.nam[place].org) (533) 257-6879   **Sober Fun Activities: www.soberInternational Electronics Exchangeactivities.[employer].vBrand/[facility]//[facility] Recovery Connection (MRC)  [facility] connects people seeking recovery to resources that help foster and sustain long-term recovery.  Whether you are seeking resources for treatment, transportation, housing, job training, education, health care or other pathways to recovery, [facility] is a great place to start.    Phone: 553.607.5437.  www.[place]"[employer].CoachClub (Great listing of all types of recovery and non-recovery related resources)       Dave Cerna MD   [facility] Psychiatry

## 2021-02-01 NOTE — TELEPHONE ENCOUNTER
DIAGNOSIS: low back pain, possible disc herniation / self referred / Counts include 234 beds at the [facility] / no surgeries or imaging   APPOINTMENT DATE: 2/5/21   NOTES STATUS DETAILS   OFFICE NOTE from referring provider N/A    OFFICE NOTE from other specialist N/A    DISCHARGE SUMMARY from hospital N/A    DISCHARGE REPORT from the ER N/A    OPERATIVE REPORT N/A    EMG report N/A    MEDICATION LIST N/A    MRI N/A    DEXA (osteoporosis/bone health) N/A    CT SCAN N/A    XRAYS (IMAGES & REPORTS) N/A

## 2021-02-05 ENCOUNTER — PRE VISIT (OUTPATIENT)
Dept: ORTHOPEDICS | Facility: CLINIC | Age: 26
End: 2021-02-05

## 2021-02-12 DIAGNOSIS — M54.50 LOW BACK PAIN: Primary | ICD-10-CM

## 2021-02-16 ENCOUNTER — HOSPITAL ENCOUNTER (INPATIENT)
Facility: CLINIC | Age: 26
LOS: 1 days | Discharge: HOME | End: 2021-02-18
Attending: EMERGENCY MEDICINE | Admitting: PSYCHIATRY & NEUROLOGY
Payer: COMMERCIAL

## 2021-02-16 DIAGNOSIS — F17.200 TOBACCO USE DISORDER: ICD-10-CM

## 2021-02-16 DIAGNOSIS — E55.9 VITAMIN D DEFICIENCY: ICD-10-CM

## 2021-02-16 DIAGNOSIS — Z11.52 ENCOUNTER FOR SCREENING LABORATORY TESTING FOR SEVERE ACUTE RESPIRATORY SYNDROME CORONAVIRUS 2 (SARS-COV-2): ICD-10-CM

## 2021-02-16 DIAGNOSIS — F10.939 ALCOHOL WITHDRAWAL SYNDROME, WITH UNSPECIFIED COMPLICATION (H): ICD-10-CM

## 2021-02-16 DIAGNOSIS — F41.1 GAD (GENERALIZED ANXIETY DISORDER): Primary | ICD-10-CM

## 2021-02-16 DIAGNOSIS — F10.10 ALCOHOL ABUSE: ICD-10-CM

## 2021-02-16 LAB
ALBUMIN SERPL-MCNC: 4.4 G/DL (ref 3.4–5)
ALCOHOL BREATH TEST: 0.12 (ref 0–0.01)
ALP SERPL-CCNC: 58 U/L (ref 40–150)
ALT SERPL W P-5'-P-CCNC: 20 U/L (ref 0–70)
AMPHETAMINES UR QL SCN: POSITIVE
ANION GAP SERPL CALCULATED.3IONS-SCNC: 10 MMOL/L (ref 3–14)
AST SERPL W P-5'-P-CCNC: 38 U/L (ref 0–45)
BARBITURATES UR QL: NEGATIVE
BASOPHILS # BLD AUTO: 0 10E9/L (ref 0–0.2)
BASOPHILS NFR BLD AUTO: 0.6 %
BENZODIAZ UR QL: NEGATIVE
BILIRUB SERPL-MCNC: 0.4 MG/DL (ref 0.2–1.3)
BUN SERPL-MCNC: 7 MG/DL (ref 7–30)
CALCIUM SERPL-MCNC: 8.5 MG/DL (ref 8.5–10.1)
CANNABINOIDS UR QL SCN: NEGATIVE
CHLORIDE SERPL-SCNC: 100 MMOL/L (ref 94–109)
CO2 SERPL-SCNC: 28 MMOL/L (ref 20–32)
COCAINE UR QL: POSITIVE
CREAT SERPL-MCNC: 0.88 MG/DL (ref 0.66–1.25)
DIFFERENTIAL METHOD BLD: ABNORMAL
EOSINOPHIL # BLD AUTO: 0 10E9/L (ref 0–0.7)
EOSINOPHIL NFR BLD AUTO: 0.3 %
ERYTHROCYTE [DISTWIDTH] IN BLOOD BY AUTOMATED COUNT: 12.9 % (ref 10–15)
ETHANOL UR QL SCN: POSITIVE
GFR SERPL CREATININE-BSD FRML MDRD: >90 ML/MIN/{1.73_M2}
GLUCOSE SERPL-MCNC: 81 MG/DL (ref 70–99)
HCT VFR BLD AUTO: 42.6 % (ref 40–53)
HGB BLD-MCNC: 14.1 G/DL (ref 13.3–17.7)
IMM GRANULOCYTES # BLD: 0 10E9/L (ref 0–0.4)
IMM GRANULOCYTES NFR BLD: 0 %
LABORATORY COMMENT REPORT: NORMAL
LYMPHOCYTES # BLD AUTO: 1.4 10E9/L (ref 0.8–5.3)
LYMPHOCYTES NFR BLD AUTO: 38.2 %
MCH RBC QN AUTO: 28 PG (ref 26.5–33)
MCHC RBC AUTO-ENTMCNC: 33.1 G/DL (ref 31.5–36.5)
MCV RBC AUTO: 85 FL (ref 78–100)
MONOCYTES # BLD AUTO: 0.3 10E9/L (ref 0–1.3)
MONOCYTES NFR BLD AUTO: 9.1 %
NEUTROPHILS # BLD AUTO: 1.9 10E9/L (ref 1.6–8.3)
NEUTROPHILS NFR BLD AUTO: 51.8 %
NRBC # BLD AUTO: 0 10*3/UL
NRBC BLD AUTO-RTO: 0 /100
OPIATES UR QL SCN: NEGATIVE
PLATELET # BLD AUTO: 183 10E9/L (ref 150–450)
POTASSIUM SERPL-SCNC: 3.3 MMOL/L (ref 3.4–5.3)
PROT SERPL-MCNC: 8 G/DL (ref 6.8–8.8)
RBC # BLD AUTO: 5.04 10E12/L (ref 4.4–5.9)
SARS-COV-2 RNA RESP QL NAA+PROBE: NEGATIVE
SODIUM SERPL-SCNC: 138 MMOL/L (ref 133–144)
SPECIMEN SOURCE: NORMAL
WBC # BLD AUTO: 3.6 10E9/L (ref 4–11)

## 2021-02-16 PROCEDURE — 80307 DRUG TEST PRSMV CHEM ANLYZR: CPT | Performed by: EMERGENCY MEDICINE

## 2021-02-16 PROCEDURE — C9803 HOPD COVID-19 SPEC COLLECT: HCPCS | Performed by: EMERGENCY MEDICINE

## 2021-02-16 PROCEDURE — 250N000013 HC RX MED GY IP 250 OP 250 PS 637: Performed by: EMERGENCY MEDICINE

## 2021-02-16 PROCEDURE — 84443 ASSAY THYROID STIM HORMONE: CPT | Performed by: EMERGENCY MEDICINE

## 2021-02-16 PROCEDURE — 99284 EMERGENCY DEPT VISIT MOD MDM: CPT | Performed by: EMERGENCY MEDICINE

## 2021-02-16 PROCEDURE — U0003 INFECTIOUS AGENT DETECTION BY NUCLEIC ACID (DNA OR RNA); SEVERE ACUTE RESPIRATORY SYNDROME CORONAVIRUS 2 (SARS-COV-2) (CORONAVIRUS DISEASE [COVID-19]), AMPLIFIED PROBE TECHNIQUE, MAKING USE OF HIGH THROUGHPUT TECHNOLOGIES AS DESCRIBED BY CMS-2020-01-R: HCPCS | Performed by: EMERGENCY MEDICINE

## 2021-02-16 PROCEDURE — 80053 COMPREHEN METABOLIC PANEL: CPT | Performed by: EMERGENCY MEDICINE

## 2021-02-16 PROCEDURE — 85025 COMPLETE CBC W/AUTO DIFF WBC: CPT | Performed by: EMERGENCY MEDICINE

## 2021-02-16 PROCEDURE — U0005 INFEC AGEN DETEC AMPLI PROBE: HCPCS | Performed by: EMERGENCY MEDICINE

## 2021-02-16 PROCEDURE — 82977 ASSAY OF GGT: CPT | Performed by: EMERGENCY MEDICINE

## 2021-02-16 PROCEDURE — 82607 VITAMIN B-12: CPT | Performed by: EMERGENCY MEDICINE

## 2021-02-16 PROCEDURE — 80320 DRUG SCREEN QUANTALCOHOLS: CPT | Performed by: EMERGENCY MEDICINE

## 2021-02-16 PROCEDURE — 99285 EMERGENCY DEPT VISIT HI MDM: CPT | Performed by: EMERGENCY MEDICINE

## 2021-02-16 PROCEDURE — 82306 VITAMIN D 25 HYDROXY: CPT | Performed by: EMERGENCY MEDICINE

## 2021-02-16 RX ORDER — DIAZEPAM 5 MG
5-20 TABLET ORAL EVERY 30 MIN PRN
Status: DISCONTINUED | OUTPATIENT
Start: 2021-02-16 | End: 2021-02-17

## 2021-02-16 RX ORDER — NICOTINE 21 MG/24HR
1 PATCH, TRANSDERMAL 24 HOURS TRANSDERMAL ONCE
Status: DISCONTINUED | OUTPATIENT
Start: 2021-02-16 | End: 2021-02-17

## 2021-02-16 RX ADMIN — DIAZEPAM 5 MG: 5 TABLET ORAL at 22:21

## 2021-02-16 RX ADMIN — NICOTINE 1 PATCH: 21 PATCH, EXTENDED RELEASE TRANSDERMAL at 23:58

## 2021-02-17 PROBLEM — F10.10 ALCOHOL ABUSE: Status: ACTIVE | Noted: 2021-02-17

## 2021-02-17 LAB
DEPRECATED CALCIDIOL+CALCIFEROL SERPL-MC: 8 UG/L (ref 20–75)
GGT SERPL-CCNC: 62 U/L (ref 0–75)
POTASSIUM SERPL-SCNC: 4 MMOL/L (ref 3.4–5.3)
TSH SERPL DL<=0.005 MIU/L-ACNC: 2.81 MU/L (ref 0.4–4)
VIT B12 SERPL-MCNC: 330 PG/ML (ref 193–986)

## 2021-02-17 PROCEDURE — 250N000013 HC RX MED GY IP 250 OP 250 PS 637: Performed by: EMERGENCY MEDICINE

## 2021-02-17 PROCEDURE — 250N000013 HC RX MED GY IP 250 OP 250 PS 637: Performed by: PSYCHIATRY & NEUROLOGY

## 2021-02-17 PROCEDURE — 36415 COLL VENOUS BLD VENIPUNCTURE: CPT | Performed by: PHYSICIAN ASSISTANT

## 2021-02-17 PROCEDURE — 128N000004 HC R&B CD ADULT

## 2021-02-17 PROCEDURE — H2032 ACTIVITY THERAPY, PER 15 MIN: HCPCS

## 2021-02-17 PROCEDURE — 84132 ASSAY OF SERUM POTASSIUM: CPT | Performed by: PHYSICIAN ASSISTANT

## 2021-02-17 PROCEDURE — 99231 SBSQ HOSP IP/OBS SF/LOW 25: CPT | Performed by: PHYSICIAN ASSISTANT

## 2021-02-17 PROCEDURE — 99207 PR CONSULT E&M CHANGED TO SUBSEQUENT LEVEL: CPT | Performed by: PHYSICIAN ASSISTANT

## 2021-02-17 PROCEDURE — 250N000013 HC RX MED GY IP 250 OP 250 PS 637: Performed by: NURSE PRACTITIONER

## 2021-02-17 RX ORDER — DIAZEPAM 5 MG
5-20 TABLET ORAL EVERY 30 MIN PRN
Status: DISCONTINUED | OUTPATIENT
Start: 2021-02-17 | End: 2021-02-18 | Stop reason: HOSPADM

## 2021-02-17 RX ORDER — MULTIPLE VITAMINS W/ MINERALS TAB 9MG-400MCG
1 TAB ORAL DAILY
Status: DISCONTINUED | OUTPATIENT
Start: 2021-02-17 | End: 2021-02-18 | Stop reason: HOSPADM

## 2021-02-17 RX ORDER — TRAZODONE HYDROCHLORIDE 50 MG/1
50 TABLET, FILM COATED ORAL
Status: DISCONTINUED | OUTPATIENT
Start: 2021-02-17 | End: 2021-02-18 | Stop reason: HOSPADM

## 2021-02-17 RX ORDER — MAGNESIUM HYDROXIDE/ALUMINUM HYDROXICE/SIMETHICONE 120; 1200; 1200 MG/30ML; MG/30ML; MG/30ML
30 SUSPENSION ORAL EVERY 4 HOURS PRN
Status: DISCONTINUED | OUTPATIENT
Start: 2021-02-17 | End: 2021-02-18 | Stop reason: HOSPADM

## 2021-02-17 RX ORDER — HYDROXYZINE HYDROCHLORIDE 50 MG/1
50 TABLET, FILM COATED ORAL EVERY 4 HOURS PRN
Status: DISCONTINUED | OUTPATIENT
Start: 2021-02-17 | End: 2021-02-18 | Stop reason: HOSPADM

## 2021-02-17 RX ORDER — FOLIC ACID 1 MG/1
1 TABLET ORAL DAILY
Status: DISCONTINUED | OUTPATIENT
Start: 2021-02-17 | End: 2021-02-18 | Stop reason: HOSPADM

## 2021-02-17 RX ORDER — ATENOLOL 50 MG/1
50 TABLET ORAL DAILY PRN
Status: DISCONTINUED | OUTPATIENT
Start: 2021-02-17 | End: 2021-02-18 | Stop reason: HOSPADM

## 2021-02-17 RX ORDER — LANOLIN ALCOHOL/MO/W.PET/CERES
100 CREAM (GRAM) TOPICAL DAILY
Status: DISCONTINUED | OUTPATIENT
Start: 2021-02-17 | End: 2021-02-18 | Stop reason: HOSPADM

## 2021-02-17 RX ORDER — NICOTINE 21 MG/24HR
1 PATCH, TRANSDERMAL 24 HOURS TRANSDERMAL DAILY
Status: DISCONTINUED | OUTPATIENT
Start: 2021-02-17 | End: 2021-02-18 | Stop reason: HOSPADM

## 2021-02-17 RX ORDER — BUSPIRONE HYDROCHLORIDE 5 MG/1
5 TABLET ORAL 3 TIMES DAILY
Status: DISCONTINUED | OUTPATIENT
Start: 2021-02-17 | End: 2021-02-18 | Stop reason: HOSPADM

## 2021-02-17 RX ORDER — GABAPENTIN 100 MG/1
100 CAPSULE ORAL EVERY 4 HOURS PRN
Status: DISCONTINUED | OUTPATIENT
Start: 2021-02-17 | End: 2021-02-18 | Stop reason: HOSPADM

## 2021-02-17 RX ORDER — HYDROXYZINE HYDROCHLORIDE 25 MG/1
25 TABLET, FILM COATED ORAL EVERY 4 HOURS PRN
Status: DISCONTINUED | OUTPATIENT
Start: 2021-02-17 | End: 2021-02-18 | Stop reason: HOSPADM

## 2021-02-17 RX ORDER — ACETAMINOPHEN 325 MG/1
650 TABLET ORAL EVERY 4 HOURS PRN
Status: DISCONTINUED | OUTPATIENT
Start: 2021-02-17 | End: 2021-02-18 | Stop reason: HOSPADM

## 2021-02-17 RX ORDER — ACAMPROSATE CALCIUM 333 MG/1
666 TABLET, DELAYED RELEASE ORAL 3 TIMES DAILY
Status: DISCONTINUED | OUTPATIENT
Start: 2021-02-18 | End: 2021-02-18 | Stop reason: HOSPADM

## 2021-02-17 RX ORDER — AMOXICILLIN 250 MG
1 CAPSULE ORAL 2 TIMES DAILY PRN
Status: DISCONTINUED | OUTPATIENT
Start: 2021-02-17 | End: 2021-02-18 | Stop reason: HOSPADM

## 2021-02-17 RX ADMIN — DIAZEPAM 10 MG: 5 TABLET ORAL at 07:50

## 2021-02-17 RX ADMIN — TRAZODONE HYDROCHLORIDE 50 MG: 50 TABLET ORAL at 02:38

## 2021-02-17 RX ADMIN — GABAPENTIN 100 MG: 100 CAPSULE ORAL at 10:16

## 2021-02-17 RX ADMIN — GABAPENTIN 100 MG: 100 CAPSULE ORAL at 14:42

## 2021-02-17 RX ADMIN — DIAZEPAM 10 MG: 5 TABLET ORAL at 10:16

## 2021-02-17 RX ADMIN — DIAZEPAM 10 MG: 5 TABLET ORAL at 16:33

## 2021-02-17 RX ADMIN — ATENOLOL 50 MG: 50 TABLET ORAL at 20:15

## 2021-02-17 RX ADMIN — DIAZEPAM 10 MG: 5 TABLET ORAL at 05:46

## 2021-02-17 RX ADMIN — FOLIC ACID 1 MG: 1 TABLET ORAL at 07:50

## 2021-02-17 RX ADMIN — DIAZEPAM 10 MG: 5 TABLET ORAL at 12:24

## 2021-02-17 RX ADMIN — MULTIPLE VITAMINS W/ MINERALS TAB 1 TABLET: TAB at 07:50

## 2021-02-17 RX ADMIN — TRAZODONE HYDROCHLORIDE 50 MG: 50 TABLET ORAL at 21:22

## 2021-02-17 RX ADMIN — THIAMINE HCL TAB 100 MG 100 MG: 100 TAB at 07:50

## 2021-02-17 RX ADMIN — BUSPIRONE HYDROCHLORIDE 5 MG: 5 TABLET ORAL at 14:42

## 2021-02-17 RX ADMIN — DIAZEPAM 10 MG: 5 TABLET ORAL at 02:38

## 2021-02-17 RX ADMIN — BUSPIRONE HYDROCHLORIDE 5 MG: 5 TABLET ORAL at 20:15

## 2021-02-17 RX ADMIN — NICOTINE 1 PATCH: 21 PATCH, EXTENDED RELEASE TRANSDERMAL at 07:50

## 2021-02-17 RX ADMIN — DIAZEPAM 10 MG: 5 TABLET ORAL at 00:42

## 2021-02-17 RX ADMIN — NICOTINE POLACRILEX 2 MG: 2 GUM, CHEWING BUCCAL at 21:22

## 2021-02-17 ASSESSMENT — ACTIVITIES OF DAILY LIVING (ADL)
ORAL_HYGIENE: INDEPENDENT
LAUNDRY: WITH SUPERVISION
HYGIENE/GROOMING: INDEPENDENT
HYGIENE/GROOMING: INDEPENDENT
DRESS: INDEPENDENT
DRESS: STREET CLOTHES
LAUNDRY: WITH SUPERVISION
ORAL_HYGIENE: INDEPENDENT

## 2021-02-17 ASSESSMENT — MIFFLIN-ST. JEOR: SCORE: 1531.5

## 2021-02-17 NOTE — ED NOTES
ED to Behavioral Floor Handoff    SITUATION  Manjeet Canales is a 25 year old male who speaks English and lives in a home with family members The patient arrived in the ED by private car from home with a complaint of Alcohol Problem (seeking detox)  .The patient's current symptoms started/worsened 1 month(s) ago and during this time the symptoms have increased.   In the ED, pt was diagnosed with   Final diagnoses:   None        Initial vitals were: BP: (!) 155/103  Pulse: 71  Temp: 98.5  F (36.9  C)  Resp: 12  Weight: 56.7 kg (125 lb)  SpO2: 97 %   --------  Is the patient diabetic? No   If yes, last blood glucose? --     If yes, was this treated in the ED? --  --------  Is the patient inebriated (ETOH) Yes or Impaired on other substances? Yes  MSSA done? Yes  Last MSSA score: --    Were withdrawal symptoms treated? Yes  Does the patient have a seizure history? No. If yes, date of most recent seizure--  --------  Is the patient patient experiencing suicidal ideation? denies current or recent suicidal ideation     Homicidal ideation? denies current or recent homicidal ideation or behaviors.    Self-injurious behavior/urges? denies current or recent self injurious behavior or ideation.  ------  Was pt aggressive in the ED No  Was a code called No  Is the pt now cooperative? Yes  -------  Meds given in ED:   Medications   diazepam (VALIUM) tablet 5-20 mg (5 mg Oral Given 2/16/21 2221)   nicotine (NICODERM CQ) 21 MG/24HR 24 hr patch 1 patch (has no administration in time range)      Family present during ED course? No  Family currently present? No    BACKGROUND  Does the patient have a cognitive impairment or developmental disability? No  Allergies: No Known Allergies.   Social demographics are   Social History     Socioeconomic History     Marital status: Single     Spouse name: None     Number of children: None     Years of education: None     Highest education level: None   Occupational History     None    Social Needs     Financial resource strain: None     Food insecurity     Worry: None     Inability: None     Transportation needs     Medical: None     Non-medical: None   Tobacco Use     Smoking status: Current Every Day Smoker     Smokeless tobacco: Never Used   Substance and Sexual Activity     Alcohol use: Yes     Drug use: Yes     Sexual activity: None   Lifestyle     Physical activity     Days per week: None     Minutes per session: None     Stress: None   Relationships     Social connections     Talks on phone: None     Gets together: None     Attends Buddhism service: None     Active member of club or organization: None     Attends meetings of clubs or organizations: None     Relationship status: None     Intimate partner violence     Fear of current or ex partner: None     Emotionally abused: None     Physically abused: None     Forced sexual activity: None   Other Topics Concern     None   Social History Narrative     None        ASSESSMENT  Labs results   Labs Ordered and Resulted from Time of ED Arrival Up to the Time of Departure from the ED   DRUG ABUSE SCREEN 6 CHEM DEP URINE ([facility]) - Abnormal; Notable for the following components:       Result Value    Amphetamine Qual Urine Positive (*)     Cocaine Qual Urine Positive (*)     Ethanol Qual Urine Positive (*)     All other components within normal limits   COMPREHENSIVE METABOLIC PANEL - Abnormal; Notable for the following components:    Potassium 3.3 (*)     All other components within normal limits   CBC WITH PLATELETS DIFFERENTIAL - Abnormal; Notable for the following components:    WBC 3.6 (*)     All other components within normal limits   ALCOHOL BREATH TEST POCT - Abnormal; Notable for the following components:    Alcohol Breath Test 0.122 (*)     All other components within normal limits   SARS-COV-2 (COVID-19) VIRUS RT-PCR   MSSA SCORE AND VS   NOTIFY      Imaging Studies: No results found for this or any previous visit (from the past 24  hour(s)).   Most recent vital signs BP (!) 150/107   Pulse 105   Temp 98.3  F (36.8  C) (Oral)   Resp 12   Wt 56.7 kg (125 lb)   SpO2 99%   BMI 19.01 kg/m     Abnormal labs/tests/findings requiring intervention:---   Pain control: good  Nausea control: good    RECOMMENDATION  Are any infection precautions needed (MRSA, VRE, etc.)? No If yes, what infection? --  ---  Does the patient have mobility issues? independently. If yes, what device does the pt use? ---  ---  Is patient on 72 hour hold or commitment? No If on 72 hour hold, have hold and rights been given to patient? N/A  Are admitting orders written if after 10 p.m. ?N/A  Tasks needing to be completed:---     Janneth Patino, RN   [phone] [place] [facility]   [phone] [facility]

## 2021-02-17 NOTE — ED TRIAGE NOTES
"Seeking detox  Last drink 6 hours ago  Patient states he drink 15 beers a day  Feels like he is currently withdrawing .  Uses drugs \" pretty much anything expect heroin\" Coke and meth is the past two days.   "

## 2021-02-17 NOTE — PROGRESS NOTES
"Writer met with pt to discuss aftercare. Pt reports he would like to discharge today, 2/17. States he does not feel as though he is in any active withdrawal. Reports he has \"many things to take care of outside of here.\" Pt states he plans to follow-up with 12 step meetings but declines any further needs from case management. Pt has [employer] for insurance. AVS initiated.  "

## 2021-02-17 NOTE — PLAN OF CARE
Behavioral Team Discussion: (2/17/2021)    Continued Stay Criteria/Rationale: Patient admitted for alcohol withdrawal, complicated by comorbid stimulant use.  Plan: The following services will be provided to the patient; psychiatric assessment, medication management, therapeutic milieu, individual and group support, and skills groups.   Participants: 3A Provider: Dr. Nikhil Elam MD; 3A RN's: Jarrod Silverman, RN; 3A CM's: Maria Antonia Agudelo [facility], Melissa Barbour MA [facility] and Hailey Pickens [facility]   Summary/Recommendation: Providers will assess today for treatment recommendations, discharge planning, and aftercare plans. CM will meet with pt for discharge planning.   Medical/Physical: Internal medicine consult to be completed 2/17/2021.  Precautions:   Behavioral Orders   Procedures     Code 1 - Restrict to Unit     Routine Programming     As clinically indicated     Status 15     Every 15 minutes.     Withdrawal precautions     Rationale for change in precautions or plan: N/A  Progress: No Change.

## 2021-02-17 NOTE — ED PROVIDER NOTES
[facility] EMERGENCY DEPARTMENT ([facility])    2/16/21        History     Chief Complaint   Patient presents with     Alcohol Problem     seeking detox     HPI  Manjeet Canales is a 25 year old male with a medical history significant for alcohol abuse and methamphetamine use who presents to the ED for alcohol withdrawal.  Patient states his last drink was about 6 hours ago.  He states that for the past 3 weeks he has been drinking 15 cans of beer a day.  He has had withdrawal symptoms in the past which incorporated agitation, paranoia, abdominal symptoms but has never had seizures or delirium tremens.  He also has chronic use of cocaine and methamphetamine with his last use being approximately 2 days ago.  He denies any suicidal homicidal ideations.  He has been in detox before.    I have reviewed the Medications, Allergies, Past Medical and Surgical History, and Social History in the [facility] system.  PAST MEDICAL HISTORY:   Past Medical History:   Diagnosis Date     Alcohol abuse        PAST SURGICAL HISTORY: History reviewed. No pertinent surgical history.    Past medical history, past surgical history, medications, and allergies were reviewed with the patient. Additional pertinent items: None    FAMILY HISTORY: No family history on file.    SOCIAL HISTORY:   Social History     Tobacco Use     Smoking status: Current Every Day Smoker     Smokeless tobacco: Never Used   Substance Use Topics     Alcohol use: Yes     Social history was reviewed with the patient. Additional pertinent items: None      Patient's Medications    No medications on file        No Known Allergies     Review of Systems  A complete review of systems was performed with pertinent positives and negatives noted in the HPI, and all other systems negative.    Physical Exam   BP: (!) 155/103  Pulse: 71  Temp: 98.5  F (36.9  C)  Resp: 12  Weight: 56.7 kg (125 lb)  SpO2: 97 %      Physical Exam  Vitals signs and nursing note reviewed.    Constitutional:       General: He is not in acute distress.     Appearance: He is well-developed. He is not ill-appearing, toxic-appearing or diaphoretic.      Comments: Anxious with mild alcohol withdrawal mild tachycardia   HENT:      Head: Normocephalic and atraumatic.   Eyes:      General: No scleral icterus.     Extraocular Movements: Extraocular movements intact.      Pupils: Pupils are equal, round, and reactive to light.   Neck:      Musculoskeletal: Normal range of motion and neck supple.   Cardiovascular:      Rate and Rhythm: Tachycardia present.   Pulmonary:      Effort: Pulmonary effort is normal. No respiratory distress.   Skin:     General: Skin is warm and dry.      Findings: No rash.   Neurological:      Mental Status: He is alert and oriented to person, place, and time.         ED Course        Procedures                           Results for orders placed or performed during the hospital encounter of 02/16/21 (from the past 24 hour(s))   Comprehensive metabolic panel   Result Value Ref Range    Sodium 138 133 - 144 mmol/L    Potassium 3.3 (L) 3.4 - 5.3 mmol/L    Chloride 100 94 - 109 mmol/L    Carbon Dioxide 28 20 - 32 mmol/L    Anion Gap 10 3 - 14 mmol/L    Glucose 81 70 - 99 mg/dL    Urea Nitrogen 7 7 - 30 mg/dL    Creatinine 0.88 0.66 - 1.25 mg/dL    GFR Estimate >90 >60 mL/min/[1.73_m2]    GFR Estimate If Black >90 >60 mL/min/[1.73_m2]    Calcium 8.5 8.5 - 10.1 mg/dL    Bilirubin Total 0.4 0.2 - 1.3 mg/dL    Albumin 4.4 3.4 - 5.0 g/dL    Protein Total 8.0 6.8 - 8.8 g/dL    Alkaline Phosphatase 58 40 - 150 U/L    ALT 20 0 - 70 U/L    AST 38 0 - 45 U/L   CBC with platelets differential   Result Value Ref Range    WBC 3.6 (L) 4.0 - 11.0 10e9/L    RBC Count 5.04 4.4 - 5.9 10e12/L    Hemoglobin 14.1 13.3 - 17.7 g/dL    Hematocrit 42.6 40.0 - 53.0 %    MCV 85 78 - 100 fl    MCH 28.0 26.5 - 33.0 pg    MCHC 33.1 31.5 - 36.5 g/dL    RDW 12.9 10.0 - 15.0 %    Platelet Count 183 150 - 450 10e9/L     Diff Method Automated Method     % Neutrophils 51.8 %    % Lymphocytes 38.2 %    % Monocytes 9.1 %    % Eosinophils 0.3 %    % Basophils 0.6 %    % Immature Granulocytes 0.0 %    Nucleated RBCs 0 0 /100    Absolute Neutrophil 1.9 1.6 - 8.3 10e9/L    Absolute Lymphocytes 1.4 0.8 - 5.3 10e9/L    Absolute Monocytes 0.3 0.0 - 1.3 10e9/L    Absolute Eosinophils 0.0 0.0 - 0.7 10e9/L    Absolute Basophils 0.0 0.0 - 0.2 10e9/L    Abs Immature Granulocytes 0.0 0 - 0.4 10e9/L    Absolute Nucleated RBC 0.0    Asymptomatic SARS-CoV-2 COVID-19 Virus (Coronavirus) by PCR    Specimen: Nasopharyngeal   Result Value Ref Range    SARS-CoV-2 Virus Specimen Source Nasopharyngeal     SARS-CoV-2 PCR Result NEGATIVE     SARS-CoV-2 PCR Comment       Testing was performed using the [employer]acio Xpress SARS-CoV-2 Assay on the Cepheid Gene-Xpert   Instrument Systems. Additional information about this Emergency Use Authorization (EUA)   assay can be found via the Lab Guide.     Drug abuse screen 6 urine (chem dep)   Result Value Ref Range    Amphetamine Qual Urine Positive (A) NEG^Negative    Barbiturates Qual Urine Negative NEG^Negative    Benzodiazepine Qual Urine Negative NEG^Negative    Cannabinoids Qual Urine Negative NEG^Negative    Cocaine Qual Urine Positive (A) NEG^Negative    Ethanol Qual Urine Positive (A) NEG^Negative    Opiates Qualitative Urine Negative NEG^Negative   Alcohol breath test POCT   Result Value Ref Range    Alcohol Breath Test 0.122 (A) 0.00 - 0.01     Medications   diazepam (VALIUM) tablet 5-20 mg (10 mg Oral Given 2/17/21 0042)   nicotine (NICODERM CQ) 21 MG/24HR 24 hr patch 1 patch (1 patch Transdermal Patch/Med Applied 2/16/21 5471)             Assessments & Plan (with Medical Decision Making)     This is a 25-year-old male coming into the emergency room requesting detox from alcohol as he drinks 15 cans of beer a day and is having withdrawal symptoms that started this afternoon.  His last drink was approximately 6  hours ago.  He does appear anxious and is slightly tachycardic.  He does have some alcohol in his system with his breath alcohol pain at approximately .11  He will be provided with a dose of benzos here in the emergency room and will be admitted to the detox center for further care management.  I have reviewed the nursing notes.    I have reviewed the findings, diagnosis, plan and need for follow up with the patient.    New Prescriptions    No medications on file       Final diagnoses:   Alcohol abuse     Ayo Lyman MD  2/16/2021   [facility] EMERGENCY DEPARTMENT     Vega Lyman MD  02/17/21 0102

## 2021-02-17 NOTE — PROVIDER NOTIFICATION
Pt reports anxiety 8 of 10. No prn for hydroxyzine active. Updated Dr. Elam and obtained order for prn hydroxyzine.    ADDENDUM: updated Dr. Elam about pt refusal to sign in under voluntary status and a signed 72 hour intent in place. Pt will need to stay her while we are treating his withdrawal and is not able to leave at this time per Dr. Elam. Pt updated.

## 2021-02-17 NOTE — PHARMACY-ADMISSION MEDICATION HISTORY
Admission Medication History status for the 2/16/2021 admission is complete.  See EPIC admission navigator for Prior to Admission medications.    Medication history sources:  patient and Surescripts    Medication history source reliability: Good     Medication adherence:  n/a    Changes made to PTA medication list (reason)  Added: n/a  Deleted: n/a  Changed: n/a    Additional medication history information (including reliability of information, actions taken by pharmacist): None    Time spent in this activity: 10 minutes     Medication history completed by: Bri Lopez, Pharm.D    Prior to Admission medications    Not on File

## 2021-02-17 NOTE — PROGRESS NOTES
02/17/21 0218   Patient Belongings   Did you bring any home meds/supplements to the hospital?  No   Patient Belongings Remaining with Patient clothing;body jewelry;glasses   Patient Belongings Put in Hospital Secure Location (Security or Locker, etc.) other (see comments)   Belongings Search Yes   Clothing Search Yes   Second Staff Jaxon RN &Perez   STORAGE BIN: laced shoes, ecig, hat, mask, socks, belt  MED BIN: phone, wallet, keys, $14 cash  SECURITY: [place], [place], Visa x2( [employer]), [employer]  A               Admission:  I am responsible for any personal items that are not sent to the safe or pharmacy.  [facility] is not responsible for loss, theft or damage of any property in my possession.    Signature:  _________________________________ Date: _______  Time: _____                                              Staff Signature:  ____________________________ Date: ________  Time: _____      2nd Staff person, if patient is unable/unwilling to sign:    Signature: ________________________________ Date: ________  Time: _____     Discharge:  [facility] has returned all of my personal belongings:    Signature: _________________________________ Date: ________  Time: _____                                          Staff Signature:  ____________________________ Date: ________  Time: _____
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
## 2021-02-17 NOTE — PLAN OF CARE
"Pt states usually feels worse with alcohol withdrawal \"yeah 12 hours I really struggle, once I get past that it's not that bad\". Pt is tachycardic with pulse of 126, he is moderately high in tremors and is in obvious alcohol withdrawal. Pt asking about leaving and initially questioning if he were to take valium if that prolongs his stay. Informed him we are monitoring and treating symptoms and that we treat the withdrawal with valium and that taking valium for alcohol withdrawal is a good decision and not taking it can actually worsen his withdrawal and prolong his hospitalization. Pt accepted this information and 10 mg po valium administered for MSSA of 15.       He reports \"3 weeks of drinking every day but before that it's been 2 weeks\" of sobriety. Reports he has never been to treatment \"I did go to [facility] and had an assessment but I couldn't afford to miss work\" states [facility] recommended inpatient so he opted to not go to treatment. Reports working for [employer].    Pt denies suicidal ideation plans or intent. Denies anxiety and depression stating \"I feel neutral right now\". Will continue to monitor and intervene as warranted.  "
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
## 2021-02-17 NOTE — PROGRESS NOTES
S;Pt admitted via ED for detox from alcohol  B:Pt has been using 15 beers daily,last use 2/16l21 evening. Pt denies history of seizures and johnnie DT's,but has experienced some hallucinosis in withdrawal in the past. Pt is additionally using cocaine once weekly,smoked or snorted and has recently used methamphetamine concurrently on several occasions. Pt denies any acute or chronic illness or injury. Pt denies any past or current suicidal or homicidal ideation.  A:Pt is in obvious alcohol withdrawal on admission.  R;Monitor and treat per MSSA with valium protocol. Withdrawal precautions and 15 min checks for safety.

## 2021-02-17 NOTE — PROGRESS NOTES
Pt did sign the CFR-42 main consent form. Still declines signing the CD consent form and declines to rescind the 72 hour intent. Seems focused on leaving the unit and returning to work asap.

## 2021-02-17 NOTE — PROGRESS NOTES
Pt declined to sign consent for treatment on admission and the MD that evaluated him was gone for the noc. Pt signed 72 hour intent to leave and nursing supervisor and psyc on-call provider notified per protocol pending attendings evaluation regarding his request to be discharged.

## 2021-02-18 ENCOUNTER — DOCUMENTATION ONLY (OUTPATIENT)
Dept: BEHAVIORAL HEALTH | Facility: CLINIC | Age: 26
End: 2021-02-18

## 2021-02-18 VITALS
HEIGHT: 68 IN | HEART RATE: 89 BPM | SYSTOLIC BLOOD PRESSURE: 109 MMHG | RESPIRATION RATE: 16 BRPM | DIASTOLIC BLOOD PRESSURE: 71 MMHG | BODY MASS INDEX: 18.94 KG/M2 | OXYGEN SATURATION: 100 % | WEIGHT: 125 LBS | TEMPERATURE: 98.2 F

## 2021-02-18 PROCEDURE — 250N000013 HC RX MED GY IP 250 OP 250 PS 637: Performed by: NURSE PRACTITIONER

## 2021-02-18 PROCEDURE — 250N000013 HC RX MED GY IP 250 OP 250 PS 637: Performed by: PSYCHIATRY & NEUROLOGY

## 2021-02-18 RX ORDER — GABAPENTIN 100 MG/1
100 CAPSULE ORAL EVERY 4 HOURS PRN
Qty: 90 CAPSULE | Refills: 1 | Status: ON HOLD | OUTPATIENT
Start: 2021-02-18 | End: 2022-04-23

## 2021-02-18 RX ORDER — VITAMIN B COMPLEX
50 TABLET ORAL DAILY
Status: DISCONTINUED | OUTPATIENT
Start: 2021-02-18 | End: 2021-02-18 | Stop reason: HOSPADM

## 2021-02-18 RX ORDER — ACAMPROSATE CALCIUM 333 MG/1
666 TABLET, DELAYED RELEASE ORAL 3 TIMES DAILY
Qty: 180 TABLET | Refills: 3 | Status: ON HOLD | OUTPATIENT
Start: 2021-02-18 | End: 2022-04-23

## 2021-02-18 RX ORDER — LANOLIN ALCOHOL/MO/W.PET/CERES
100 CREAM (GRAM) TOPICAL DAILY
Qty: 90 TABLET | Refills: 1 | Status: ON HOLD | OUTPATIENT
Start: 2021-02-19 | End: 2022-04-23

## 2021-02-18 RX ORDER — TRAZODONE HYDROCHLORIDE 50 MG/1
50 TABLET, FILM COATED ORAL
Qty: 30 TABLET | Refills: 1 | Status: ON HOLD | OUTPATIENT
Start: 2021-02-18 | End: 2022-04-23

## 2021-02-18 RX ORDER — BUSPIRONE HYDROCHLORIDE 5 MG/1
5 TABLET ORAL 3 TIMES DAILY
Qty: 90 TABLET | Refills: 3 | Status: ON HOLD | OUTPATIENT
Start: 2021-02-18 | End: 2022-04-23

## 2021-02-18 RX ORDER — MULTIPLE VITAMINS W/ MINERALS TAB 9MG-400MCG
1 TAB ORAL DAILY
Qty: 90 TABLET | Refills: 1 | Status: ON HOLD | OUTPATIENT
Start: 2021-02-19 | End: 2022-04-23

## 2021-02-18 RX ORDER — VITAMIN B COMPLEX
50 TABLET ORAL DAILY
Qty: 90 TABLET | Refills: 1 | Status: ON HOLD | OUTPATIENT
Start: 2021-02-18 | End: 2022-04-25

## 2021-02-18 RX ORDER — NICOTINE 21 MG/24HR
1 PATCH, TRANSDERMAL 24 HOURS TRANSDERMAL DAILY
Qty: 28 PATCH | Refills: 3 | Status: ON HOLD | OUTPATIENT
Start: 2021-02-19 | End: 2022-04-23

## 2021-02-18 RX ORDER — FOLIC ACID 1 MG/1
1 TABLET ORAL DAILY
Qty: 90 TABLET | Refills: 1 | Status: ON HOLD | OUTPATIENT
Start: 2021-02-19 | End: 2022-04-23

## 2021-02-18 RX ADMIN — BUSPIRONE HYDROCHLORIDE 5 MG: 5 TABLET ORAL at 08:16

## 2021-02-18 RX ADMIN — MULTIPLE VITAMINS W/ MINERALS TAB 1 TABLET: TAB at 08:16

## 2021-02-18 RX ADMIN — Medication 50 MCG: at 09:16

## 2021-02-18 RX ADMIN — THIAMINE HCL TAB 100 MG 100 MG: 100 TAB at 08:16

## 2021-02-18 RX ADMIN — GABAPENTIN 100 MG: 100 CAPSULE ORAL at 12:31

## 2021-02-18 RX ADMIN — FOLIC ACID 1 MG: 1 TABLET ORAL at 08:16

## 2021-02-18 RX ADMIN — NICOTINE POLACRILEX 2 MG: 2 GUM, CHEWING BUCCAL at 15:01

## 2021-02-18 RX ADMIN — ACAMPROSATE CALCIUM 666 MG: 333 TABLET, DELAYED RELEASE ORAL at 13:44

## 2021-02-18 RX ADMIN — ACAMPROSATE CALCIUM 666 MG: 333 TABLET, DELAYED RELEASE ORAL at 08:16

## 2021-02-18 RX ADMIN — BUSPIRONE HYDROCHLORIDE 5 MG: 5 TABLET ORAL at 13:44

## 2021-02-18 RX ADMIN — NICOTINE 1 PATCH: 21 PATCH, EXTENDED RELEASE TRANSDERMAL at 08:16

## 2021-02-18 ASSESSMENT — ACTIVITIES OF DAILY LIVING (ADL)
DRESS: STREET CLOTHES
LAUNDRY: WITH SUPERVISION
ORAL_HYGIENE: INDEPENDENT
HYGIENE/GROOMING: INDEPENDENT

## 2021-02-18 NOTE — PLAN OF CARE
Pt MSSA score today is a 5. Pt appears much improved symptomatically speaking from alcohol withdrawal. He has nearly resolved from having tremors and pulse is wnl at 75 today. Pt still wants to discharge today and will likely be out of detox later today if he does not have any setback in symptoms. Pt denies any suicidal ideation plans or intent. Denies anxiety and depression. Endorses feeling happiness today.

## 2021-02-18 NOTE — PLAN OF CARE
Pt has been placed in Out Of Detox status and is cleared to discharge per provider order.    Pt denied SI/SIB    Pt plans to follow up with [facility]. AVS reviewed with Pt

## 2021-02-18 NOTE — PLAN OF CARE
Problem: General Rehab Plan of Care  Goal: Therapeutic Recreation/Music Therapy Goal (Art Therapy)  Description: The patient and/or their representative will achieve their patient-specific goals related to the plan of care.  The patient-specific goals include: trauma containment  Outcome: No Change     Art Therapy directive is to create a drawing or painting of a safe place and to identify five items within safe place that represent each of the five senses. Goals of directive:  emotional expression, emotional regulation, trauma containment. Pt was a positive participant, engaged in art making for the full duration of group. Pt finished safe place drawing but chose not to share with author or group. Pts mood was calm, pleasant.

## 2021-02-18 NOTE — PROGRESS NOTES
Prior Authorization **INITIATED**    Medication: Acamprosate Calcium 333mg EC tabs  Insurance Company: [employer] - Phone [phone] Fax [phone]  Pharmacy Filling the Rx: [facility] - [place], [place] - [address]  Filling Pharmacy Phone: [phone]  Filling Pharmacy Fax: [phone]  Start Date: 2/18/2021  Reference #: Covermymeds Key: [id number]  Comments:  Discharge pharmacy sent patient with 1 week supply while auth is pending. Will retroactively bill once determination received.      Justyna Oconnor CPhT  [facility] Liaison  Pronouns: She/Her/Hers    [facility]  [address] [facility] [address]  [address], [place] [address]   [email or web address]  [email or web address]   Phone: [phone]  Pager: [phone]  Fax: [phone]

## 2021-02-18 NOTE — DISCHARGE INSTRUCTIONS
Behavioral Discharge Planning and Instructions  THANK YOU FOR CHOOSING [address]  [phone]    Summary: You were admitted to Station [facility] on 2/17/2021 for detoxification from alcohol.  A medical exam was performed that included lab work. You have met with a  and opted to return rain.  Please take care and make your recovery a daily priority, Manjeet! It was a pleasure working with you and the entire treatment team here wishes you the very best in your recovery!     Recommendation:  12 step support group meetings    Main Diagnoses:  Per Dr. Nikhil Elam MD:  1.  Alcohol use disorder, severe, recurrent, and complicated by methamphetamine, tachycardia, and anxiety.   2.  Generalized anxiety disorder, worse with withdrawal.   3.  Stimulant use disorder.     Major Treatments, Procedures and Findings:  You were treated for alcohol detoxification using valium administered based on the [facility] protocol. You declined a chemical dependency assessment. You had labs drawn and those results were reviewed with you. Please take a copy of your lab work with you to your next primary care provider appointment.    Symptoms to Report:  If you experience more anxiety, confusion, sleeplessness, deep sadness or thoughts of suicide, notify your treatment team or notify your primary care provider. IF ANY OF THE SYMPTOMS YOU ARE EXPERIENCING ARE A MEDICAL EMERGENCY CALL 911 IMMEDIATELY.     Lifestyle Adjustment: Adjust your lifestyle to get enough sleep, relaxation, exercise and good nutrition. Continue to develop healthy coping skills to decrease stress and promote a sober living environment. Do not use mood altering substances including alcohol, illegal drugs or addictive medications other than what is currently prescribed.     Disposition: Home    Facts about COVID19 at www.cdc.gov/COVID19 and www.[place].gov/covid19    Keeping hands clean is one of the most important steps we can take to avoid getting sick and spreading  germs to others.  Please wash your hands frequently and lather with soap for at least 20 seconds!    Medical Follow-Up:  On 2/19/2021 at 12:40PM  [facility]  [place] [address], [place] [address]  [phone]   [place] [address]      Treatment Follow-Up:  [facility]  [address] [place], [place] [address] [phone]    Recovery apps for your phone to locate current in person and zoom recovery meetings  [place] - meeting tri  AA  - meeting tri  Meeting guide - meeting tri  Quick NA meeting - meeting tri  [place]- has various apps    Resources:  *due to covid-19 most AA/NA meetings are being held online*  AA meetings online search for them at: https://aa-intergroup.org (worldwide meeting listings)  AA meetings for [place] area can be found online at: https://a[place].org (click local online meetings listings)  NA meetings for [place] area can be found online at: https://www.nam[place].org  (click find a meeting)  AA and NA Sponsors are excellent resources for support and you can find one at any support group meeting.   Alcoholics Anonymous (https://aa.org/): for information 24 hours/day  AA Intergroup service office in [place] (http://www.aastpa[place].org/) 398.470.4618  AA Intergroup service office in [facility]: 258.807.1791. (http://www.a[place].org/)  Narcotics Anonymous (www.nam[place].org) (702) 897-9297  https://aafairviewriverside.org/meetings  SMART Recovery - self management for addiction recovery:  www.smartrecovery.org  Pathways ~ A Health Crisis Resource & Support Center:  429.966.3465.  https://prescribetoprevent.org/patient-education/videos/  http://www.harmreduction.org  [facility] 501-614-0443  Support Group:  AA/NA and Sponsor/support.  National [place] on Mental Illness (www.[place].kaya.org): 524.396.8021 or 568-627-2924.  Alcoholics Anonymous  (www.alcoholics-anonymous.org): Check your phone book for your local chapter.  Suicide Awareness Voices of Education (SAVE) (www.save.org): 703-140-TYLB (7940)  National Suicide Prevention Line (www.mentalhealthmn.org): 250-151-CCZZ (4132)  Mental Health Consumer/Survivor Network of [place] (www.mhcsn.net): 372.794.8090 or 931-684-7910  Mental Health Association of [place] (www.mentalhealth.org): 556.592.3435 or 126-170-2850   Substance Abuse and Mental Health Services (www.samhsa.gov)  [place] Opioid Prevention Coalition: www.opioidcoalition.org    [place] Recovery Connection (MRC)  [facility] connects people seeking recovery to resources that help foster and sustain long-term recovery.  Whether you are seeking resources for treatment, transportation, housing, job training, education, health care or other pathways to recovery, [facility] is a great place to start.  832.725.5277.  www.[employer] Pod casts for nutrition and wellness  Listen on Apple Podcasts  [facility] [facility]   Nutrition       Understand the connection between what you eat and how you feel. Hosted by licensed nutritionists and dietitians from [employer] [facility] we share practical, real-life solutions for healthier living through nutrition.     General Medication Instructions:   See your medication sheet(s) for instructions.   Take all medications as prescribed.  Make no changes unless your primary care provider suggests them.   Go to all your primary care provider visits.  Be sure to have all your required lab tests. This way, your medicines can be refilled on time.  Do not use any forms of alcohol.    Please Note:  If you have any questions at anytime after you are discharged please call [facility] detox unit 3[facility] at [phone].  [facility], Behavioral Intake [phone]  Medical Records call [phone]  Outpatient Behavioral Intake call [phone]  LP+ Wait List/Bed  Availability call [phone]    Please remember to take all of your behavioral discharge planning and lab paperwork to any follow up appointments, it contains your lab results, diagnosis, medication list and discharge recommendations.      THANK YOU FOR CHOOSING [facility]

## 2021-02-18 NOTE — PLAN OF CARE
"  Problem: Substance Withdrawal  Goal: Substance Withdrawal  Description: Signs and symptoms of listed problems will be absent or manageable.  Outcome: No Change     Pt scored a 9 and a 7 on the MSSA scale, receiving 10 mg of valium during the evening shift. Pt has had elevated heart rates throughout the shift at 116 bpm and 111 bpm, Pt received atenolol at 2000 vital checks. Pt continues to ask about getting out of here and states \"he can't miss work\".  Pt attended art therapy this evening.   "
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
## 2021-02-19 NOTE — PROGRESS NOTES
Prior Authorization **APPROVED**    Authorization Effective Date: 2/18/2021  Authorization Expiration Date: 2/18/2022  Medication: Acamprosate Calcium 333mg EC tabs **APPROVED**  Approved Dose/Quantity: Up to 6 tablets daily  Reference #: Covermymeds Key: [id number]   Insurance Company: [employer] - Phone [phone] Fax [phone]  Expected CoPay: $1.00     CoPay Card Available: No    Foundation Assistance Needed: n/a  Which Pharmacy is filling the prescription (Not needed for infusion/clinic administered): [facility] [place], [place] - [address]  Pharmacy Notified: Yes  Patient Notified: Yes  Comments:  Discharge pharmacy sent patient with 1 week supply while auth is pending. *Retroactively Billed*      Justyna Oconnor CPhT  [facility] Liaison  Pronouns: She/Her/Hers    [facility]  [address] [facility]  [address], [place] [address]   [email or web address]  [email or web address]   Phone: [phone]  Pager: [phone]  Fax: [phone]

## 2021-02-27 ENCOUNTER — HOSPITAL ENCOUNTER (EMERGENCY)
Facility: CLINIC | Age: 26
Discharge: HOME | End: 2021-02-27
Attending: EMERGENCY MEDICINE | Admitting: EMERGENCY MEDICINE
Payer: COMMERCIAL

## 2021-02-27 ENCOUNTER — APPOINTMENT (OUTPATIENT)
Dept: CT IMAGING | Facility: CLINIC | Age: 26
End: 2021-02-27
Attending: EMERGENCY MEDICINE
Payer: COMMERCIAL

## 2021-02-27 ENCOUNTER — APPOINTMENT (OUTPATIENT)
Dept: GENERAL RADIOLOGY | Facility: CLINIC | Age: 26
End: 2021-02-27
Attending: EMERGENCY MEDICINE
Payer: COMMERCIAL

## 2021-02-27 VITALS
WEIGHT: 135 LBS | OXYGEN SATURATION: 99 % | RESPIRATION RATE: 14 BRPM | HEART RATE: 104 BPM | SYSTOLIC BLOOD PRESSURE: 126 MMHG | BODY MASS INDEX: 20.46 KG/M2 | DIASTOLIC BLOOD PRESSURE: 85 MMHG | TEMPERATURE: 98 F | HEIGHT: 68 IN

## 2021-02-27 DIAGNOSIS — S30.1XXA CONTUSION OF ABDOMINAL WALL, INITIAL ENCOUNTER: ICD-10-CM

## 2021-02-27 DIAGNOSIS — F10.920 ALCOHOLIC INTOXICATION WITHOUT COMPLICATION (H): ICD-10-CM

## 2021-02-27 LAB
ALCOHOL BREATH TEST: 0.27 (ref 0–0.01)
ALCOHOL BREATH TEST: 0.31 (ref 0–0.01)
ANION GAP SERPL CALCULATED.3IONS-SCNC: 10 MMOL/L (ref 3–14)
BASOPHILS # BLD AUTO: 0.1 10E9/L (ref 0–0.2)
BASOPHILS NFR BLD AUTO: 1 %
BUN SERPL-MCNC: 7 MG/DL (ref 7–30)
CALCIUM SERPL-MCNC: 8.1 MG/DL (ref 8.5–10.1)
CHLORIDE SERPL-SCNC: 113 MMOL/L (ref 94–109)
CO2 SERPL-SCNC: 27 MMOL/L (ref 20–32)
CREAT SERPL-MCNC: 1.02 MG/DL (ref 0.66–1.25)
DIFFERENTIAL METHOD BLD: NORMAL
EOSINOPHIL # BLD AUTO: 0 10E9/L (ref 0–0.7)
EOSINOPHIL NFR BLD AUTO: 0.7 %
ERYTHROCYTE [DISTWIDTH] IN BLOOD BY AUTOMATED COUNT: 13.2 % (ref 10–15)
ETHANOL SERPL-MCNC: 0.38 G/DL
GFR SERPL CREATININE-BSD FRML MDRD: >90 ML/MIN/{1.73_M2}
GLUCOSE SERPL-MCNC: 85 MG/DL (ref 70–99)
HCT VFR BLD AUTO: 40.8 % (ref 40–53)
HGB BLD-MCNC: 13.3 G/DL (ref 13.3–17.7)
IMM GRANULOCYTES # BLD: 0 10E9/L (ref 0–0.4)
IMM GRANULOCYTES NFR BLD: 0.2 %
LYMPHOCYTES # BLD AUTO: 2.2 10E9/L (ref 0.8–5.3)
LYMPHOCYTES NFR BLD AUTO: 37.1 %
MCH RBC QN AUTO: 27.9 PG (ref 26.5–33)
MCHC RBC AUTO-ENTMCNC: 32.6 G/DL (ref 31.5–36.5)
MCV RBC AUTO: 86 FL (ref 78–100)
MONOCYTES # BLD AUTO: 0.7 10E9/L (ref 0–1.3)
MONOCYTES NFR BLD AUTO: 11.2 %
NEUTROPHILS # BLD AUTO: 3 10E9/L (ref 1.6–8.3)
NEUTROPHILS NFR BLD AUTO: 49.8 %
NRBC # BLD AUTO: 0 10*3/UL
NRBC BLD AUTO-RTO: 0 /100
PLATELET # BLD AUTO: 303 10E9/L (ref 150–450)
POTASSIUM SERPL-SCNC: 3.1 MMOL/L (ref 3.4–5.3)
RBC # BLD AUTO: 4.76 10E12/L (ref 4.4–5.9)
SODIUM SERPL-SCNC: 150 MMOL/L (ref 133–144)
WBC # BLD AUTO: 6 10E9/L (ref 4–11)

## 2021-02-27 PROCEDURE — 99285 EMERGENCY DEPT VISIT HI MDM: CPT | Mod: 25 | Performed by: EMERGENCY MEDICINE

## 2021-02-27 PROCEDURE — 82077 ASSAY SPEC XCP UR&BREATH IA: CPT | Performed by: EMERGENCY MEDICINE

## 2021-02-27 PROCEDURE — 99285 EMERGENCY DEPT VISIT HI MDM: CPT | Performed by: EMERGENCY MEDICINE

## 2021-02-27 PROCEDURE — 74177 CT ABD & PELVIS W/CONTRAST: CPT

## 2021-02-27 PROCEDURE — 250N000013 HC RX MED GY IP 250 OP 250 PS 637: Performed by: EMERGENCY MEDICINE

## 2021-02-27 PROCEDURE — 80048 BASIC METABOLIC PNL TOTAL CA: CPT | Performed by: EMERGENCY MEDICINE

## 2021-02-27 PROCEDURE — 250N000011 HC RX IP 250 OP 636: Performed by: EMERGENCY MEDICINE

## 2021-02-27 PROCEDURE — 71046 X-RAY EXAM CHEST 2 VIEWS: CPT

## 2021-02-27 PROCEDURE — 250N000009 HC RX 250: Performed by: EMERGENCY MEDICINE

## 2021-02-27 PROCEDURE — 82075 ASSAY OF BREATH ETHANOL: CPT | Performed by: EMERGENCY MEDICINE

## 2021-02-27 PROCEDURE — 85025 COMPLETE CBC W/AUTO DIFF WBC: CPT | Performed by: EMERGENCY MEDICINE

## 2021-02-27 RX ORDER — IOPAMIDOL 755 MG/ML
100 INJECTION, SOLUTION INTRAVASCULAR ONCE
Status: COMPLETED | OUTPATIENT
Start: 2021-02-27 | End: 2021-02-27

## 2021-02-27 RX ORDER — POTASSIUM CHLORIDE 1.5 G/1.58G
20 POWDER, FOR SOLUTION ORAL ONCE
Status: DISCONTINUED | OUTPATIENT
Start: 2021-02-27 | End: 2021-02-27 | Stop reason: HOSPADM

## 2021-02-27 RX ADMIN — NICOTINE POLACRILEX 4 MG: 4 GUM, CHEWING BUCCAL at 18:12

## 2021-02-27 RX ADMIN — IOPAMIDOL 66 ML: 755 INJECTION, SOLUTION INTRAVENOUS at 16:58

## 2021-02-27 RX ADMIN — SODIUM CHLORIDE 56 ML: 9 INJECTION, SOLUTION INTRAVENOUS at 16:50

## 2021-02-27 ASSESSMENT — MIFFLIN-ST. JEOR: SCORE: 1576.86
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
## 2021-02-27 NOTE — DISCHARGE INSTRUCTIONS
Discharge home with sober .      Imaging today did not show any concerns. (No broken bones.  Lungs looked normal.  No injury to abdominal organs.)     You can take ibuprofen for pain if needed.

## 2021-02-28 ASSESSMENT — ENCOUNTER SYMPTOMS
CHEST TIGHTNESS: 0
FEVER: 0
CHOKING: 0
APNEA: 0
STRIDOR: 0
PALPITATIONS: 0
COUGH: 0
WHEEZING: 0
CHILLS: 0

## 2021-02-28 NOTE — ED NOTES
Pt became quite restless, wanting to leave. Pt refused meds. Pt argued with me and dr cruz showed no understanding of us needing to watch until more sober. Pt informed he was on hold but only wanted exit. Pt brought to watch hallway were he pulled out IV and tried to start using e cig. Pt stopped by security and pt pushed them still trying to vape. Pt taken down to floor by security and [employer] staff were in [place] after just finishing another code and they took over. Dr cruz was there and pt allow to move to chair but reiterated he was on hold until sober or sober ride came in.

## 2021-03-07 ENCOUNTER — HEALTH MAINTENANCE LETTER (OUTPATIENT)
Age: 26
End: 2021-03-07

## 2021-10-10 ENCOUNTER — HEALTH MAINTENANCE LETTER (OUTPATIENT)
Age: 26
End: 2021-10-10

## 2022-03-27 ENCOUNTER — HEALTH MAINTENANCE LETTER (OUTPATIENT)
Age: 27
End: 2022-03-27

## 2022-04-22 ENCOUNTER — TELEPHONE (OUTPATIENT)
Dept: BEHAVIORAL HEALTH | Facility: CLINIC | Age: 27
End: 2022-04-22

## 2022-04-22 ENCOUNTER — HOSPITAL ENCOUNTER (INPATIENT)
Facility: CLINIC | Age: 27
LOS: 2 days | Discharge: HOME | End: 2022-04-25
Attending: FAMILY MEDICINE | Admitting: PSYCHIATRY & NEUROLOGY
Payer: COMMERCIAL

## 2022-04-22 DIAGNOSIS — Z20.822 LAB TEST NEGATIVE FOR COVID-19 VIRUS: ICD-10-CM

## 2022-04-22 DIAGNOSIS — F10.239 ALCOHOL DEPENDENCE WITH WITHDRAWAL WITH COMPLICATION (H): ICD-10-CM

## 2022-04-22 DIAGNOSIS — E55.9 VITAMIN D DEFICIENCY: ICD-10-CM

## 2022-04-22 DIAGNOSIS — F10.939 ALCOHOL WITHDRAWAL SYNDROME, WITH UNSPECIFIED COMPLICATION (H): Primary | ICD-10-CM

## 2022-04-22 LAB
ALBUMIN SERPL-MCNC: 4.5 G/DL (ref 3.4–5)
ALCOHOL BREATH TEST: 0 (ref 0–0.01)
ALP SERPL-CCNC: 46 U/L (ref 40–150)
ALT SERPL W P-5'-P-CCNC: 54 U/L (ref 0–70)
AMPHETAMINES UR QL SCN: ABNORMAL
ANION GAP SERPL CALCULATED.3IONS-SCNC: 20 MMOL/L (ref 3–14)
AST SERPL W P-5'-P-CCNC: 50 U/L (ref 0–45)
BARBITURATES UR QL: ABNORMAL
BASOPHILS # BLD AUTO: 0 10E3/UL (ref 0–0.2)
BASOPHILS NFR BLD AUTO: 0 %
BENZODIAZ UR QL: ABNORMAL
BILIRUB SERPL-MCNC: 1 MG/DL (ref 0.2–1.3)
BUN SERPL-MCNC: 17 MG/DL (ref 7–30)
CALCIUM SERPL-MCNC: 9.9 MG/DL (ref 8.5–10.1)
CANNABINOIDS UR QL SCN: ABNORMAL
CHLORIDE BLD-SCNC: 101 MMOL/L (ref 94–109)
CO2 SERPL-SCNC: 20 MMOL/L (ref 20–32)
COCAINE UR QL: ABNORMAL
CREAT SERPL-MCNC: 0.76 MG/DL (ref 0.66–1.25)
EOSINOPHIL # BLD AUTO: 0 10E3/UL (ref 0–0.7)
EOSINOPHIL NFR BLD AUTO: 0 %
ERYTHROCYTE [DISTWIDTH] IN BLOOD BY AUTOMATED COUNT: 13 % (ref 10–15)
GFR SERPL CREATININE-BSD FRML MDRD: >90 ML/MIN/1.73M2
GLUCOSE BLD-MCNC: 109 MG/DL (ref 70–99)
HCT VFR BLD AUTO: 38.5 % (ref 40–53)
HGB BLD-MCNC: 13 G/DL (ref 13.3–17.7)
IMM GRANULOCYTES # BLD: 0.1 10E3/UL
IMM GRANULOCYTES NFR BLD: 1 %
LIPASE SERPL-CCNC: 123 U/L (ref 73–393)
LYMPHOCYTES # BLD AUTO: 0.6 10E3/UL (ref 0.8–5.3)
LYMPHOCYTES NFR BLD AUTO: 5 %
MAGNESIUM SERPL-MCNC: 1.9 MG/DL (ref 1.6–2.3)
MCH RBC QN AUTO: 27.3 PG (ref 26.5–33)
MCHC RBC AUTO-ENTMCNC: 33.8 G/DL (ref 31.5–36.5)
MCV RBC AUTO: 81 FL (ref 78–100)
MONOCYTES # BLD AUTO: 0.6 10E3/UL (ref 0–1.3)
MONOCYTES NFR BLD AUTO: 4 %
NEUTROPHILS # BLD AUTO: 11.8 10E3/UL (ref 1.6–8.3)
NEUTROPHILS NFR BLD AUTO: 90 %
NRBC # BLD AUTO: 0 10E3/UL
NRBC BLD AUTO-RTO: 0 /100
OPIATES UR QL SCN: ABNORMAL
PLATELET # BLD AUTO: 171 10E3/UL (ref 150–450)
POTASSIUM BLD-SCNC: 3.5 MMOL/L (ref 3.4–5.3)
PROT SERPL-MCNC: 8.6 G/DL (ref 6.8–8.8)
RBC # BLD AUTO: 4.77 10E6/UL (ref 4.4–5.9)
SARS-COV-2 RNA RESP QL NAA+PROBE: NEGATIVE
SODIUM SERPL-SCNC: 141 MMOL/L (ref 133–144)
WBC # BLD AUTO: 13.1 10E3/UL (ref 4–11)

## 2022-04-22 PROCEDURE — 96375 TX/PRO/DX INJ NEW DRUG ADDON: CPT | Performed by: FAMILY MEDICINE

## 2022-04-22 PROCEDURE — 85025 COMPLETE CBC W/AUTO DIFF WBC: CPT | Performed by: FAMILY MEDICINE

## 2022-04-22 PROCEDURE — 36415 COLL VENOUS BLD VENIPUNCTURE: CPT | Performed by: FAMILY MEDICINE

## 2022-04-22 PROCEDURE — 250N000013 HC RX MED GY IP 250 OP 250 PS 637: Performed by: FAMILY MEDICINE

## 2022-04-22 PROCEDURE — 80307 DRUG TEST PRSMV CHEM ANLYZR: CPT | Performed by: FAMILY MEDICINE

## 2022-04-22 PROCEDURE — 96361 HYDRATE IV INFUSION ADD-ON: CPT | Performed by: FAMILY MEDICINE

## 2022-04-22 PROCEDURE — 83690 ASSAY OF LIPASE: CPT | Performed by: FAMILY MEDICINE

## 2022-04-22 PROCEDURE — 250N000011 HC RX IP 250 OP 636: Performed by: FAMILY MEDICINE

## 2022-04-22 PROCEDURE — 258N000003 HC RX IP 258 OP 636: Performed by: FAMILY MEDICINE

## 2022-04-22 PROCEDURE — C9803 HOPD COVID-19 SPEC COLLECT: HCPCS | Performed by: FAMILY MEDICINE

## 2022-04-22 PROCEDURE — 250N000011 HC RX IP 250 OP 636

## 2022-04-22 PROCEDURE — 99285 EMERGENCY DEPT VISIT HI MDM: CPT | Mod: 25 | Performed by: FAMILY MEDICINE

## 2022-04-22 PROCEDURE — 83735 ASSAY OF MAGNESIUM: CPT | Performed by: FAMILY MEDICINE

## 2022-04-22 PROCEDURE — 99284 EMERGENCY DEPT VISIT MOD MDM: CPT | Performed by: FAMILY MEDICINE

## 2022-04-22 PROCEDURE — U0003 INFECTIOUS AGENT DETECTION BY NUCLEIC ACID (DNA OR RNA); SEVERE ACUTE RESPIRATORY SYNDROME CORONAVIRUS 2 (SARS-COV-2) (CORONAVIRUS DISEASE [COVID-19]), AMPLIFIED PROBE TECHNIQUE, MAKING USE OF HIGH THROUGHPUT TECHNOLOGIES AS DESCRIBED BY CMS-2020-01-R: HCPCS | Performed by: FAMILY MEDICINE

## 2022-04-22 PROCEDURE — 80053 COMPREHEN METABOLIC PANEL: CPT | Performed by: FAMILY MEDICINE

## 2022-04-22 PROCEDURE — 82075 ASSAY OF BREATH ETHANOL: CPT | Performed by: FAMILY MEDICINE

## 2022-04-22 PROCEDURE — 96374 THER/PROPH/DIAG INJ IV PUSH: CPT | Performed by: FAMILY MEDICINE

## 2022-04-22 RX ORDER — DIAZEPAM 5 MG
5 TABLET ORAL ONCE
Status: COMPLETED | OUTPATIENT
Start: 2022-04-22 | End: 2022-04-22

## 2022-04-22 RX ORDER — ONDANSETRON 4 MG/1
4 TABLET, ORALLY DISINTEGRATING ORAL EVERY 8 HOURS PRN
Status: ON HOLD | COMMUNITY
Start: 2022-01-20 | End: 2022-04-25

## 2022-04-22 RX ORDER — FOLIC ACID 1 MG/1
1 TABLET ORAL DAILY
Status: DISCONTINUED | OUTPATIENT
Start: 2022-04-23 | End: 2022-04-22

## 2022-04-22 RX ORDER — DIAZEPAM 5 MG
5-20 TABLET ORAL EVERY 30 MIN PRN
Status: DISCONTINUED | OUTPATIENT
Start: 2022-04-22 | End: 2022-04-23

## 2022-04-22 RX ORDER — LORAZEPAM 1 MG/1
1-4 TABLET ORAL EVERY 30 MIN PRN
Status: DISCONTINUED | OUTPATIENT
Start: 2022-04-22 | End: 2022-04-23

## 2022-04-22 RX ORDER — MULTIPLE VITAMINS W/ MINERALS TAB 9MG-400MCG
1 TAB ORAL DAILY
Status: DISCONTINUED | OUTPATIENT
Start: 2022-04-23 | End: 2022-04-22

## 2022-04-22 RX ORDER — ONDANSETRON 2 MG/ML
INJECTION INTRAMUSCULAR; INTRAVENOUS
Status: COMPLETED
Start: 2022-04-22 | End: 2022-04-22

## 2022-04-22 RX ORDER — ONDANSETRON 2 MG/ML
8 INJECTION INTRAMUSCULAR; INTRAVENOUS ONCE
Status: COMPLETED | OUTPATIENT
Start: 2022-04-22 | End: 2022-04-22

## 2022-04-22 RX ORDER — FOLIC ACID 1 MG/1
1 TABLET ORAL DAILY
Status: DISCONTINUED | OUTPATIENT
Start: 2022-04-22 | End: 2022-04-23

## 2022-04-22 RX ORDER — MULTIPLE VITAMINS W/ MINERALS TAB 9MG-400MCG
1 TAB ORAL DAILY
Status: DISCONTINUED | OUTPATIENT
Start: 2022-04-22 | End: 2022-04-23

## 2022-04-22 RX ORDER — SODIUM CHLORIDE 9 MG/ML
INJECTION, SOLUTION INTRAVENOUS CONTINUOUS
Status: DISCONTINUED | OUTPATIENT
Start: 2022-04-22 | End: 2022-04-23

## 2022-04-22 RX ADMIN — DIAZEPAM 10 MG: 5 TABLET ORAL at 14:12

## 2022-04-22 RX ADMIN — FOLIC ACID 1 MG: 1 TABLET ORAL at 14:12

## 2022-04-22 RX ADMIN — SODIUM CHLORIDE: 9 INJECTION, SOLUTION INTRAVENOUS at 15:31

## 2022-04-22 RX ADMIN — THIAMINE HCL TAB 100 MG 100 MG: 100 TAB at 14:12

## 2022-04-22 RX ADMIN — SODIUM CHLORIDE 1000 ML: 9 INJECTION, SOLUTION INTRAVENOUS at 14:15

## 2022-04-22 RX ADMIN — MULTIPLE VITAMINS W/ MINERALS TAB 1 TABLET: TAB at 14:12

## 2022-04-22 RX ADMIN — DIAZEPAM 10 MG: 5 TABLET ORAL at 16:05

## 2022-04-22 RX ADMIN — ONDANSETRON 8 MG: 2 INJECTION INTRAMUSCULAR; INTRAVENOUS at 14:20

## 2022-04-22 RX ADMIN — PROCHLORPERAZINE EDISYLATE 10 MG: 5 INJECTION INTRAMUSCULAR; INTRAVENOUS at 15:31

## 2022-04-22 RX ADMIN — DIAZEPAM 5 MG: 5 TABLET ORAL at 13:21

## 2022-04-22 RX ADMIN — DIAZEPAM 5 MG: 5 TABLET ORAL at 20:38

## 2022-04-22 NOTE — ED PROVIDER NOTES
ED Provider Note  [facility]      History     Chief Complaint   Patient presents with     Drug / Alcohol Assessment     Patient presents seeking detox from alcohol; last drink 10 pm yesterday. Patient drinks 15 beers or more per day. Denies history of seizures.      HPI  Manjeet Canales is a 26 year old male who presents seeking detox of alcohol.  Patient states he has been drinking up to 15 beers per day for several weeks.  Last drink was 10 PM yesterday.  Overnight he was feeling shaky and threw up multiple times.  Denies the use of other drugs.  He reports that he helps violent shaking, nausea and vomiting, restlessness, sweats but no seizures or DTs.  Today he feels as if his hands and leg are tingling and that there is a tight band around his left leg.  Denies any trauma.  No report of suicidal thoughts or hallucinations.    Past Medical History  Past Medical History:   Diagnosis Date     Alcohol abuse      History reviewed. No pertinent surgical history.  ondansetron (ZOFRAN-ODT) 4 MG ODT tab  acamprosate (CAMPRAL) 333 MG EC tablet  busPIRone (BUSPAR) 5 MG tablet  folic acid (FOLVITE) 1 MG tablet  gabapentin (NEURONTIN) 100 MG capsule  multivitamin w/minerals (THERA-VIT-M) tablet  nicotine (NICODERM CQ) 21 MG/24HR 24 hr patch  nicotine (NICORETTE) 2 MG gum  thiamine (B-1) 100 MG tablet  traZODone (DESYREL) 50 MG tablet  Vitamin D3 (CHOLECALCIFEROL) 25 mcg (1000 units) tablet      No Known Allergies  Family History  History reviewed. No pertinent family history.  Social History   Social History     Tobacco Use     Smoking status: Current Every Day Smoker     Types: Vaping Device     Smokeless tobacco: Never Used   Substance Use Topics     Alcohol use: Yes     Alcohol/week: 15.0 standard drinks     Types: 15 Cans of beer per week     Comment: daily     Drug use: Not Currently      Past medical history, past surgical history, medications, allergies, family history, and social  history were reviewed with the patient. No additional pertinent items.       Review of Systems  A complete review of systems was performed with pertinent positives and negatives noted in the HPI, and all other systems negative.    Physical Exam   BP: (!) 159/104  Pulse: 77  Temp: 98.2  F (36.8  C)  Resp: 18  Weight: 57.4 kg (126 lb 8 oz)  SpO2: 95 %  Physical Exam  Vitals and nursing note reviewed.   Constitutional:       General: He is not in acute distress.     Appearance: He is not diaphoretic.   HENT:      Head: Atraumatic.      Mouth/Throat:      Mouth: Mucous membranes are dry.   Eyes:      General: No scleral icterus.     Pupils: Pupils are equal, round, and reactive to light.   Cardiovascular:      Heart sounds: Normal heart sounds.   Pulmonary:      Effort: No respiratory distress.      Breath sounds: Normal breath sounds.   Abdominal:      General: Bowel sounds are normal.      Palpations: Abdomen is soft.      Tenderness: There is generalized abdominal tenderness. There is no guarding or rebound.   Musculoskeletal:         General: No tenderness.      Comments: Appearance of his left lower extremity is grossly normal, normal pulse, no swelling, warmth, erythema or signs of trauma.   Skin:     General: Skin is warm.      Findings: No rash.   Neurological:      General: No focal deficit present.      Mental Status: He is alert and oriented to person, place, and time.      Cranial Nerves: Cranial nerves are intact.      Motor: Tremor (Patient has tongue tremor and tremor of the hands bilateral) present.      Coordination: Coordination is intact.         ED Course      Procedures                     Results for orders placed or performed during the hospital encounter of 04/22/22   Comprehensive metabolic panel     Status: Abnormal   Result Value Ref Range    Sodium 141 133 - 144 mmol/L    Potassium 3.5 3.4 - 5.3 mmol/L    Chloride 101 94 - 109 mmol/L    Carbon Dioxide (CO2) 20 20 - 32 mmol/L    Anion Gap 20  (H) 3 - 14 mmol/L    Urea Nitrogen 17 7 - 30 mg/dL    Creatinine 0.76 0.66 - 1.25 mg/dL    Calcium 9.9 8.5 - 10.1 mg/dL    Glucose 109 (H) 70 - 99 mg/dL    Alkaline Phosphatase 46 40 - 150 U/L    AST 50 (H) 0 - 45 U/L    ALT 54 0 - 70 U/L    Protein Total 8.6 6.8 - 8.8 g/dL    Albumin 4.5 3.4 - 5.0 g/dL    Bilirubin Total 1.0 0.2 - 1.3 mg/dL    GFR Estimate >90 >60 mL/min/1.73m2   Lipase     Status: Normal   Result Value Ref Range    Lipase 123 73 - 393 U/L   Magnesium     Status: Normal   Result Value Ref Range    Magnesium 1.9 1.6 - 2.3 mg/dL   CBC with platelets and differential     Status: Abnormal   Result Value Ref Range    WBC Count 13.1 (H) 4.0 - 11.0 10e3/uL    RBC Count 4.77 4.40 - 5.90 10e6/uL    Hemoglobin 13.0 (L) 13.3 - 17.7 g/dL    Hematocrit 38.5 (L) 40.0 - 53.0 %    MCV 81 78 - 100 fL    MCH 27.3 26.5 - 33.0 pg    MCHC 33.8 31.5 - 36.5 g/dL    RDW 13.0 10.0 - 15.0 %    Platelet Count 171 150 - 450 10e3/uL    % Neutrophils 90 %    % Lymphocytes 5 %    % Monocytes 4 %    % Eosinophils 0 %    % Basophils 0 %    % Immature Granulocytes 1 %    NRBCs per 100 WBC 0 <1 /100    Absolute Neutrophils 11.8 (H) 1.6 - 8.3 10e3/uL    Absolute Lymphocytes 0.6 (L) 0.8 - 5.3 10e3/uL    Absolute Monocytes 0.6 0.0 - 1.3 10e3/uL    Absolute Eosinophils 0.0 0.0 - 0.7 10e3/uL    Absolute Basophils 0.0 0.0 - 0.2 10e3/uL    Absolute Immature Granulocytes 0.1 <=0.4 10e3/uL    Absolute NRBCs 0.0 10e3/uL   Drug abuse screen 1 urine (ED)     Status: Abnormal   Result Value Ref Range    Amphetamines Urine Screen Negative Screen Negative    Barbiturates Urine Screen Negative Screen Negative    Benzodiazepines Urine Screen Positive (A) Screen Negative    Cannabinoids Urine Screen Positive (A) Screen Negative    Cocaine Urine Screen Negative Screen Negative    Opiates Urine Screen Negative Screen Negative   Alcohol breath test POCT     Status: Normal   Result Value Ref Range    Alcohol Breath Test 0.000 0.00 - 0.01   CBC with  platelets differential     Status: Abnormal    Narrative    The following orders were created for panel order CBC with platelets differential.  Procedure                               Abnormality         Status                     ---------                               -----------         ------                     CBC with platelets and d...[[id number]]  Abnormal            Final result                 Please view results for these tests on the individual orders.   Urine Drugs of Abuse Screen     Status: Abnormal    Narrative    The following orders were created for panel order Urine Drugs of Abuse Screen.  Procedure                               Abnormality         Status                     ---------                               -----------         ------                     Drug abuse screen 1 urin...[[id number]]  Abnormal            Final result                 Please view results for these tests on the individual orders.     Medications   0.9% sodium chloride BOLUS (0 mLs Intravenous Stopped 4/22/22 1522)     Followed by   sodium chloride 0.9% infusion ( Intravenous New Bag 4/22/22 1531)   diazepam (VALIUM) tablet 5-20 mg (10 mg Oral Given 4/22/22 1605)   thiamine (B-1) tablet 100 mg (100 mg Oral Given 4/22/22 1412)   folic acid (FOLVITE) tablet 1 mg (1 mg Oral Given 4/22/22 1412)   multivitamin w/minerals (THERA-VIT-M) tablet 1 tablet (1 tablet Oral Given 4/22/22 1412)   diazepam (VALIUM) tablet 5 mg (5 mg Oral Given 4/22/22 1321)   ondansetron (ZOFRAN) injection 8 mg (8 mg Intravenous Given 4/22/22 1420)   prochlorperazine (COMPAZINE) injection 10 mg (10 mg Intravenous Given 4/22/22 1531)        Assessments & Plan (with Medical Decision Making)   26-year-old who is presenting seeking detox from alcohol.  He has been on a binge of several weeks duration and has a history of severe alcohol withdrawal symptoms.  Also presenting today with generalized abdominal pain and nausea and vomiting.  Treated  symptomatically and labs obtained.  Initially improved with Zofran, again became nauseated received Compazine and is now taking p.o.  Labs reveal some slightly elevated anion gap with a significant abnormality.  He is responding to oral benzodiazepines appropriately.  Patient is agreeable to voluntary detox admission.    I have reviewed the nursing notes. I have reviewed the findings, diagnosis, plan and need for follow up with the patient.    New Prescriptions    No medications on file       Final diagnoses:   Alcohol dependence with withdrawal with complication (H)       --  Perez Krishnamurthy MD  [facility] EMERGENCY DEPARTMENT  4/22/2022     Perez Krishnamurthy MD  04/22/22 2346

## 2022-04-22 NOTE — TELEPHONE ENCOUNTER
S ER provider called to give clinical on 25/M in [facility]    B Alcohol Detox. Pt drinking 15 beers/day for past 5 months with last drink last night, SKIP 0. Pt denies HX of withdrawal symptoms and DT's. Pt currently shaky, high pulse, nauseous. On MSSA protocol, given Valium. Pt denies using substances other than THC, UTOX pos for THC and benzos as pt given Valium. No HI, SI or other mental health concerns. Ambulatory, eating drinking. Awaiting COVID.    A Vol    R In [facility] awaiting placement  Patient cleared and ready for behavioral bed placement: Yes    10pm: Intake called and left information with [facility]. Awaiting callback

## 2022-04-22 NOTE — ED TRIAGE NOTES
Patient presents seeking detox from alcohol; last drink yesterday at 10 pm. Currently drinks 15 beers per day for the 5 months. Patient denies history of seizures. Patient denies recent use of street drugs. Patient uses tobacco.

## 2022-04-23 ENCOUNTER — TELEPHONE (OUTPATIENT)
Dept: BEHAVIORAL HEALTH | Facility: CLINIC | Age: 27
End: 2022-04-23

## 2022-04-23 PROBLEM — F10.239 ALCOHOL DEPENDENCE WITH WITHDRAWAL WITH COMPLICATION (H): Status: ACTIVE | Noted: 2022-04-23

## 2022-04-23 LAB
ALBUMIN SERPL-MCNC: 3.9 G/DL (ref 3.4–5)
ALP SERPL-CCNC: 51 U/L (ref 40–150)
ALT SERPL W P-5'-P-CCNC: 43 U/L (ref 0–70)
ANION GAP SERPL CALCULATED.3IONS-SCNC: 7 MMOL/L (ref 3–14)
AST SERPL W P-5'-P-CCNC: 45 U/L (ref 0–45)
BASOPHILS # BLD AUTO: 0 10E3/UL (ref 0–0.2)
BASOPHILS NFR BLD AUTO: 0 %
BILIRUB SERPL-MCNC: 0.8 MG/DL (ref 0.2–1.3)
BUN SERPL-MCNC: 13 MG/DL (ref 7–30)
CALCIUM SERPL-MCNC: 8.7 MG/DL (ref 8.5–10.1)
CHLORIDE BLD-SCNC: 103 MMOL/L (ref 94–109)
CO2 SERPL-SCNC: 28 MMOL/L (ref 20–32)
CREAT SERPL-MCNC: 0.79 MG/DL (ref 0.66–1.25)
EOSINOPHIL # BLD AUTO: 0 10E3/UL (ref 0–0.7)
EOSINOPHIL NFR BLD AUTO: 0 %
ERYTHROCYTE [DISTWIDTH] IN BLOOD BY AUTOMATED COUNT: 13.1 % (ref 10–15)
ETHANOL SERPL-MCNC: <0.01 G/DL
GFR SERPL CREATININE-BSD FRML MDRD: >90 ML/MIN/1.73M2
GLUCOSE BLD-MCNC: 99 MG/DL (ref 70–99)
HCT VFR BLD AUTO: 38.7 % (ref 40–53)
HGB BLD-MCNC: 12.9 G/DL (ref 13.3–17.7)
IMM GRANULOCYTES # BLD: 0 10E3/UL
IMM GRANULOCYTES NFR BLD: 0 %
LYMPHOCYTES # BLD AUTO: 2.5 10E3/UL (ref 0.8–5.3)
LYMPHOCYTES NFR BLD AUTO: 30 %
MCH RBC QN AUTO: 27.1 PG (ref 26.5–33)
MCHC RBC AUTO-ENTMCNC: 33.3 G/DL (ref 31.5–36.5)
MCV RBC AUTO: 81 FL (ref 78–100)
MONOCYTES # BLD AUTO: 0.7 10E3/UL (ref 0–1.3)
MONOCYTES NFR BLD AUTO: 9 %
NEUTROPHILS # BLD AUTO: 5.1 10E3/UL (ref 1.6–8.3)
NEUTROPHILS NFR BLD AUTO: 61 %
NRBC # BLD AUTO: 0 10E3/UL
NRBC BLD AUTO-RTO: 0 /100
PLATELET # BLD AUTO: 157 10E3/UL (ref 150–450)
POTASSIUM BLD-SCNC: 3.5 MMOL/L (ref 3.4–5.3)
PROT SERPL-MCNC: 7.6 G/DL (ref 6.8–8.8)
RBC # BLD AUTO: 4.76 10E6/UL (ref 4.4–5.9)
SODIUM SERPL-SCNC: 138 MMOL/L (ref 133–144)
WBC # BLD AUTO: 8.3 10E3/UL (ref 4–11)

## 2022-04-23 PROCEDURE — H2032 ACTIVITY THERAPY, PER 15 MIN: HCPCS

## 2022-04-23 PROCEDURE — 250N000013 HC RX MED GY IP 250 OP 250 PS 637: Performed by: PSYCHIATRY & NEUROLOGY

## 2022-04-23 PROCEDURE — 128N000004 HC R&B CD ADULT

## 2022-04-23 PROCEDURE — 99223 1ST HOSP IP/OBS HIGH 75: CPT | Mod: AI | Performed by: PSYCHIATRY & NEUROLOGY

## 2022-04-23 PROCEDURE — 85025 COMPLETE CBC W/AUTO DIFF WBC: CPT | Performed by: PSYCHIATRY & NEUROLOGY

## 2022-04-23 PROCEDURE — HZ2ZZZZ DETOXIFICATION SERVICES FOR SUBSTANCE ABUSE TREATMENT: ICD-10-PCS | Performed by: PSYCHIATRY & NEUROLOGY

## 2022-04-23 PROCEDURE — 36415 COLL VENOUS BLD VENIPUNCTURE: CPT | Performed by: PSYCHIATRY & NEUROLOGY

## 2022-04-23 PROCEDURE — 82077 ASSAY SPEC XCP UR&BREATH IA: CPT | Performed by: PSYCHIATRY & NEUROLOGY

## 2022-04-23 PROCEDURE — 80053 COMPREHEN METABOLIC PANEL: CPT | Performed by: PSYCHIATRY & NEUROLOGY

## 2022-04-23 PROCEDURE — 99231 SBSQ HOSP IP/OBS SF/LOW 25: CPT | Performed by: PHYSICIAN ASSISTANT

## 2022-04-23 RX ORDER — TRAZODONE HYDROCHLORIDE 50 MG/1
50 TABLET, FILM COATED ORAL
Status: DISCONTINUED | OUTPATIENT
Start: 2022-04-23 | End: 2022-04-25 | Stop reason: HOSPADM

## 2022-04-23 RX ORDER — HYDROXYZINE PAMOATE 50 MG/1
50 CAPSULE ORAL 3 TIMES DAILY PRN
Status: ON HOLD | COMMUNITY
End: 2022-04-25

## 2022-04-23 RX ORDER — VITAMIN B COMPLEX
50 TABLET ORAL DAILY
Status: DISCONTINUED | OUTPATIENT
Start: 2022-04-23 | End: 2022-04-25 | Stop reason: HOSPADM

## 2022-04-23 RX ORDER — PROPRANOLOL HYDROCHLORIDE 20 MG/1
20 TABLET ORAL 2 TIMES DAILY
Status: ON HOLD | COMMUNITY
End: 2022-04-25

## 2022-04-23 RX ORDER — FOLIC ACID 1 MG/1
1 TABLET ORAL DAILY
Status: DISCONTINUED | OUTPATIENT
Start: 2022-04-23 | End: 2022-04-25 | Stop reason: HOSPADM

## 2022-04-23 RX ORDER — HYDROXYZINE HYDROCHLORIDE 25 MG/1
25 TABLET, FILM COATED ORAL EVERY 4 HOURS PRN
Status: DISCONTINUED | OUTPATIENT
Start: 2022-04-23 | End: 2022-04-25 | Stop reason: HOSPADM

## 2022-04-23 RX ORDER — DIAZEPAM 5 MG
5-20 TABLET ORAL EVERY 30 MIN PRN
Status: DISCONTINUED | OUTPATIENT
Start: 2022-04-23 | End: 2022-04-25 | Stop reason: HOSPADM

## 2022-04-23 RX ORDER — MULTIPLE VITAMINS W/ MINERALS TAB 9MG-400MCG
1 TAB ORAL DAILY
Status: DISCONTINUED | OUTPATIENT
Start: 2022-04-23 | End: 2022-04-25 | Stop reason: HOSPADM

## 2022-04-23 RX ORDER — ATENOLOL 50 MG/1
50 TABLET ORAL DAILY PRN
Status: DISCONTINUED | OUTPATIENT
Start: 2022-04-23 | End: 2022-04-23

## 2022-04-23 RX ORDER — TRAZODONE HYDROCHLORIDE 50 MG/1
50-100 TABLET, FILM COATED ORAL
Status: ON HOLD | COMMUNITY
End: 2022-04-25

## 2022-04-23 RX ORDER — ONDANSETRON 4 MG/1
4 TABLET, ORALLY DISINTEGRATING ORAL EVERY 8 HOURS PRN
Status: DISCONTINUED | OUTPATIENT
Start: 2022-04-23 | End: 2022-04-25 | Stop reason: HOSPADM

## 2022-04-23 RX ORDER — MAGNESIUM HYDROXIDE/ALUMINUM HYDROXICE/SIMETHICONE 120; 1200; 1200 MG/30ML; MG/30ML; MG/30ML
30 SUSPENSION ORAL EVERY 4 HOURS PRN
Status: DISCONTINUED | OUTPATIENT
Start: 2022-04-23 | End: 2022-04-25 | Stop reason: HOSPADM

## 2022-04-23 RX ORDER — AMOXICILLIN 250 MG
1 CAPSULE ORAL 2 TIMES DAILY PRN
Status: DISCONTINUED | OUTPATIENT
Start: 2022-04-23 | End: 2022-04-25 | Stop reason: HOSPADM

## 2022-04-23 RX ORDER — NALTREXONE HYDROCHLORIDE 50 MG/1
50 TABLET, FILM COATED ORAL DAILY
Status: ON HOLD | COMMUNITY
End: 2022-04-25

## 2022-04-23 RX ORDER — FLUOXETINE 40 MG/1
40 CAPSULE ORAL DAILY
Status: ON HOLD | COMMUNITY
End: 2022-04-25

## 2022-04-23 RX ORDER — ACETAMINOPHEN 325 MG/1
650 TABLET ORAL EVERY 4 HOURS PRN
Status: DISCONTINUED | OUTPATIENT
Start: 2022-04-23 | End: 2022-04-25 | Stop reason: HOSPADM

## 2022-04-23 RX ORDER — TRAZODONE HYDROCHLORIDE 50 MG/1
50 TABLET, FILM COATED ORAL
Status: DISCONTINUED | OUTPATIENT
Start: 2022-04-23 | End: 2022-04-23

## 2022-04-23 RX ORDER — PROPRANOLOL HYDROCHLORIDE 20 MG/1
20 TABLET ORAL 2 TIMES DAILY
Status: DISCONTINUED | OUTPATIENT
Start: 2022-04-23 | End: 2022-04-25 | Stop reason: HOSPADM

## 2022-04-23 RX ORDER — NICOTINE 21 MG/24HR
1 PATCH, TRANSDERMAL 24 HOURS TRANSDERMAL DAILY
Status: DISCONTINUED | OUTPATIENT
Start: 2022-04-23 | End: 2022-04-25 | Stop reason: HOSPADM

## 2022-04-23 RX ADMIN — Medication 50 MCG: at 09:51

## 2022-04-23 RX ADMIN — PROPRANOLOL HYDROCHLORIDE 20 MG: 20 TABLET ORAL at 09:51

## 2022-04-23 RX ADMIN — FLUOXETINE HYDROCHLORIDE 40 MG: 20 CAPSULE ORAL at 09:51

## 2022-04-23 RX ADMIN — PROPRANOLOL HYDROCHLORIDE 20 MG: 20 TABLET ORAL at 20:13

## 2022-04-23 RX ADMIN — THIAMINE HCL TAB 100 MG 100 MG: 100 TAB at 08:24

## 2022-04-23 RX ADMIN — TRAZODONE HYDROCHLORIDE 50 MG: 50 TABLET ORAL at 21:51

## 2022-04-23 RX ADMIN — FOLIC ACID 1 MG: 1 TABLET ORAL at 08:23

## 2022-04-23 RX ADMIN — MULTIPLE VITAMINS W/ MINERALS TAB 1 TABLET: TAB at 08:24

## 2022-04-23 RX ADMIN — DIAZEPAM 10 MG: 5 TABLET ORAL at 08:23

## 2022-04-23 RX ADMIN — NICOTINE 1 PATCH: 21 PATCH, EXTENDED RELEASE TRANSDERMAL at 09:49

## 2022-04-23 RX ADMIN — DIAZEPAM 10 MG: 5 TABLET ORAL at 03:59

## 2022-04-23 ASSESSMENT — ACTIVITIES OF DAILY LIVING (ADL)
LAUNDRY: WITH SUPERVISION
DRESS: INDEPENDENT
ORAL_HYGIENE: INDEPENDENT
ORAL_HYGIENE: INDEPENDENT
DRESS: STREET CLOTHES;INDEPENDENT
HYGIENE/GROOMING: HANDWASHING;INDEPENDENT
HYGIENE/GROOMING: INDEPENDENT

## 2022-04-23 NOTE — PROGRESS NOTES
04/23/22 0050   Patient Belongings   Did you bring any home meds/supplements to the hospital?  No   Patient Belongings remains with patient;locker   Patient Belongings Remaining with Patient glasses   Patient Belongings Put in Hospital Secure Location (Security or Locker, etc.) cash/credit card;cell phone/electronics;clothing;keys;wallet;shoes   Belongings Search Yes   Clothing Search Yes   Second Staff Nikhil Pelayo All of patient's belongings are in a bin, his discharge bin and in his room. His important cards are in a security envelop.     BIN:  Pants, shorts, shirt, belt, jacket, mask, beanie, shoes and socks    DISCHARGE BIN:  Phone, wallet and keys    ROOM:  [facility]    SECURITY ENV # [id number] - [place] 's License, Social Security Card, [facility] Card, 3 VISA Cards, 2 MasterCards, Discover Card, Go To Card.    A               Admission:  I am responsible for any personal items that are not sent to the safe or pharmacy.  [facility] is not responsible for loss, theft or damage of any property in my possession.    Signature:  _________________________________ Date: _______  Time: _____                                              Staff Signature:  ____________________________ Date: ________  Time: _____      2nd Staff person, if patient is unable/unwilling to sign:    Signature: ________________________________ Date: ________  Time: _____     Discharge:  [facility] has returned all of my personal belongings:    Signature: _________________________________ Date: ________  Time: _____                                          Staff Signature:  ____________________________ Date: ________  Time: _____

## 2022-04-23 NOTE — H&P
Manjeet Canales is a 26 year old     History was provided by ANTWON who was a fair historian.   CHIEF COMPLAINT:  Tired      HISTORY OF PRESENT ILLNESS:      Patient is a 26-year-old  male who has generalized anxiety disorder alcoholism.  Patient was here in 2021 to detox.  He reports he was fed up of his withdrawal and realized that he needs to get help.  [facility] but could not last more than 3 days.  He takes Prozac for his anxiety.  He was having a lot of nausea or vomiting restlessness sweats.  When he tried withdrawal at home he experienced hallucinations he realized that he needs to get help  Patient has been using the following substances: Alcohol  Started at age14 , became a problem at 22   for the past 3 yrs he was drinking daily , 2pint of hard liquor.  He switched to beer trying to cut down.  Patient states he has been drinking up to 15 beers per day for 6months.     He complains of the following withdrawal symptoms shaky heart palpitation anxiety vomitung  sweats chills anxiety   Patient has tolerance, withdrawal, progressive use, loss of control, spending more time and more amount than intended. Patient has made attempts to quit, is experiencing cravings, and reports negative consequences.            alochol         USE DISORDER - CRITERIA  +admits to taking larger amounts than initially intended  +admits to unsuccessful efforts to cut down or control use  +admits to spending a great deal of time in activities necessary to obtain, use and recover from  +admits to cravings or a strong desire to use   + admits to failure to fulfill obligations at work, school or home  + admits to continued use despite negative consequences  + admits to giving up important activities to use  +admits to use in situations in which it is physically dangerous        Patient does not have a history of seizures.  Patient does have a history of delirium tremens.  He would see stuff out of the  corners of his  eyes and also hears things this happened when he was trying to withdrawal at home               Denies thoughts of suicide or harming others.      Denies auditory or visual hallucinations.     Patient  eciggs throughout the entire day    Patient denied any gambling    Substance Age first use First became regular or problematic Most recent use   Alcohol         Cannabis      Cocaine NONE       Stimulants NONE       Opioids NONE       Sedatives NONE       Hallucinogens NONE       Inhalants NONE       Other         OTC drugs NONE       Nicotine         He has no drug use no IV drug use no overdose  PSYCHIATRIC REVIEW OF SYSTEMS:         Psychiatric Review of Systems:   Depression:      Denies: depressed mood, suicidal ideation, decreased interest, changes in sleep, changes in appetite, guilt, hopelessness, helplessness, impaired concentration, decreased energy, irritability.  Jackelyn:       Denies: sleeplessness, increased goal-directed activities, abrupt increase in energy, pressured speech   impulsiveness, racing thoughts, increased goal-directed activities, pressured speech, increase in energy  Jackelyn Feeling euphoric,Distractible,Impulsive,Grandiose,Talking excessively,Have energy without sleeping,Mood swings,Irritability  Psychosis:     Denies: visual hallucinations, auditory hallucinations, paranoia  Anxiety:   : excessive worries that are difficult to control for the past 6 months,   Chronic anxiety , not able to stop worrying impacting sleep, poor conc, irritable , muscle tension fatigue    Denied  any Panic attacks  Pt has following s/o of anxiety  Palpitation pounding  chest pain nausea feeling dizzy chills numbness derealization fear of dying fear of losing control    Come out of the blue    Denies: worries that are difficult to control for the past 6 months, panic attacks    Social anxiety disorder  Denies: Marked anxiety about 1 or more social situations in which patient is exposed to scrutiny  ofothers and patient fears patient will act in the way that patient that will be negatively  causes significant impairment  Patient is afraid of being judged scrutinized  Patient avoids social situations  PTSD:   Denies: re-experiencing past trauma, nightmares, increased arousal, avoidance of traumatic stimuli, impaired function.  OCD:   denies obsessions, patient has compulsions checking, counting symmetry, cleaning, skin picking.      Denies: restriction, binging, purging.         patient denies :symptoms of attention deficit disorder include a failure to pay attention to detail, a pattern of careless mistakes, a pattern of inattentive listening, a failure to follow through with projects, poor personal organization, losing necessary objects, distractibility, forgetfulness.       patient denies Symptoms of borderline personality disorder include a fear of abandonment, unstable self-image, impulsive behavior, affective instability due to marked reactivity and mood lasting hours dissociative feeling, intense anger, unstable personal relationships, chronic feelings of boredom, periods of intense depressed mood.  Transient stressed related paranoid ideation severe dissociative symptoms              PSYCHIATRIC HISTORY     Previous diagnoses: tim          Past court commitments: none  SIB /SUICIDE ATTEMPTS NONE  Psych Hosp :none  Outpatient Programs none  [facility]  Out pt cd trt none  Detoxes 1  PAST PSYCH MED TRIALS   bupsar  Prozac    SOCIAL HISTORY                                                                             Patient is single  Patient has0   children  Patient is employed as a  [employer] employee  Patient's housing is        Family History:   FAMILY HISTORY: History reviewed. No pertinent family history.  Family Mental Health History-  Mother has bipolar illness    Substance Use Problems - present for alocholism in pgm              PTA Medications:     Medications Prior to  Admission   Medication Sig Dispense Refill Last Dose     FLUoxetine (PROZAC) 40 MG capsule Take 40 mg by mouth daily        naltrexone (DEPADE/REVIA) 50 MG tablet Take 50 mg by mouth daily        ondansetron (ZOFRAN-ODT) 4 MG ODT tab Place 4 mg under the tongue every 8 hours as needed for nausea or vomiting   4/22/2022     propranolol (INDERAL) 20 MG tablet Take 20 mg by mouth 2 times daily        traZODone (DESYREL) 50 MG tablet Take 1 tablet (50 mg) by mouth nightly as needed for sleep (may repeat after 60 minutes) 30 tablet 1      Vitamin D3 (CHOLECALCIFEROL) 25 mcg (1000 units) tablet Take 2 tablets (50 mcg) by mouth daily 90 tablet 1           Allergies:   No Known Allergies       Labs:     Recent Results (from the past 48 hour(s))   Alcohol breath test POCT    Collection Time: 04/22/22 12:35 PM   Result Value Ref Range    Alcohol Breath Test 0.000 0.00 - 0.01   Comprehensive metabolic panel    Collection Time: 04/22/22  2:03 PM   Result Value Ref Range    Sodium 141 133 - 144 mmol/L    Potassium 3.5 3.4 - 5.3 mmol/L    Chloride 101 94 - 109 mmol/L    Carbon Dioxide (CO2) 20 20 - 32 mmol/L    Anion Gap 20 (H) 3 - 14 mmol/L    Urea Nitrogen 17 7 - 30 mg/dL    Creatinine 0.76 0.66 - 1.25 mg/dL    Calcium 9.9 8.5 - 10.1 mg/dL    Glucose 109 (H) 70 - 99 mg/dL    Alkaline Phosphatase 46 40 - 150 U/L    AST 50 (H) 0 - 45 U/L    ALT 54 0 - 70 U/L    Protein Total 8.6 6.8 - 8.8 g/dL    Albumin 4.5 3.4 - 5.0 g/dL    Bilirubin Total 1.0 0.2 - 1.3 mg/dL    GFR Estimate >90 >60 mL/min/1.73m2   Lipase    Collection Time: 04/22/22  2:03 PM   Result Value Ref Range    Lipase 123 73 - 393 U/L   Magnesium    Collection Time: 04/22/22  2:03 PM   Result Value Ref Range    Magnesium 1.9 1.6 - 2.3 mg/dL   CBC with platelets and differential    Collection Time: 04/22/22  2:03 PM   Result Value Ref Range    WBC Count 13.1 (H) 4.0 - 11.0 10e3/uL    RBC Count 4.77 4.40 - 5.90 10e6/uL    Hemoglobin 13.0 (L) 13.3 - 17.7 g/dL     Hematocrit 38.5 (L) 40.0 - 53.0 %    MCV 81 78 - 100 fL    MCH 27.3 26.5 - 33.0 pg    MCHC 33.8 31.5 - 36.5 g/dL    RDW 13.0 10.0 - 15.0 %    Platelet Count 171 150 - 450 10e3/uL    % Neutrophils 90 %    % Lymphocytes 5 %    % Monocytes 4 %    % Eosinophils 0 %    % Basophils 0 %    % Immature Granulocytes 1 %    NRBCs per 100 WBC 0 <1 /100    Absolute Neutrophils 11.8 (H) 1.6 - 8.3 10e3/uL    Absolute Lymphocytes 0.6 (L) 0.8 - 5.3 10e3/uL    Absolute Monocytes 0.6 0.0 - 1.3 10e3/uL    Absolute Eosinophils 0.0 0.0 - 0.7 10e3/uL    Absolute Basophils 0.0 0.0 - 0.2 10e3/uL    Absolute Immature Granulocytes 0.1 <=0.4 10e3/uL    Absolute NRBCs 0.0 10e3/uL   Drug abuse screen 1 urine (ED)    Collection Time: 04/22/22  3:17 PM   Result Value Ref Range    Amphetamines Urine Screen Negative Screen Negative    Barbiturates Urine Screen Negative Screen Negative    Benzodiazepines Urine Screen Positive (A) Screen Negative    Cannabinoids Urine Screen Positive (A) Screen Negative    Cocaine Urine Screen Negative Screen Negative    Opiates Urine Screen Negative Screen Negative   Asymptomatic COVID-19 Virus (Coronavirus) by PCR Nasopharyngeal    Collection Time: 04/22/22  6:08 PM    Specimen: Nasopharyngeal; Swab   Result Value Ref Range    SARS CoV2 PCR Negative Negative   Comprehensive metabolic panel    Collection Time: 04/23/22 12:19 AM   Result Value Ref Range    Sodium 138 133 - 144 mmol/L    Potassium 3.5 3.4 - 5.3 mmol/L    Chloride 103 94 - 109 mmol/L    Carbon Dioxide (CO2) 28 20 - 32 mmol/L    Anion Gap 7 3 - 14 mmol/L    Urea Nitrogen 13 7 - 30 mg/dL    Creatinine 0.79 0.66 - 1.25 mg/dL    Calcium 8.7 8.5 - 10.1 mg/dL    Glucose 99 70 - 99 mg/dL    Alkaline Phosphatase 51 40 - 150 U/L    AST 45 0 - 45 U/L    ALT 43 0 - 70 U/L    Protein Total 7.6 6.8 - 8.8 g/dL    Albumin 3.9 3.4 - 5.0 g/dL    Bilirubin Total 0.8 0.2 - 1.3 mg/dL    GFR Estimate >90 >60 mL/min/1.73m2   Alcohol ethyl    Collection Time: 04/23/22 12:19  "AM   Result Value Ref Range    Alcohol ethyl <0.01 <=0.01 g/dL   CBC with platelets and differential    Collection Time: 04/23/22 12:19 AM   Result Value Ref Range    WBC Count 8.3 4.0 - 11.0 10e3/uL    RBC Count 4.76 4.40 - 5.90 10e6/uL    Hemoglobin 12.9 (L) 13.3 - 17.7 g/dL    Hematocrit 38.7 (L) 40.0 - 53.0 %    MCV 81 78 - 100 fL    MCH 27.1 26.5 - 33.0 pg    MCHC 33.3 31.5 - 36.5 g/dL    RDW 13.1 10.0 - 15.0 %    Platelet Count 157 150 - 450 10e3/uL    % Neutrophils 61 %    % Lymphocytes 30 %    % Monocytes 9 %    % Eosinophils 0 %    % Basophils 0 %    % Immature Granulocytes 0 %    NRBCs per 100 WBC 0 <1 /100    Absolute Neutrophils 5.1 1.6 - 8.3 10e3/uL    Absolute Lymphocytes 2.5 0.8 - 5.3 10e3/uL    Absolute Monocytes 0.7 0.0 - 1.3 10e3/uL    Absolute Eosinophils 0.0 0.0 - 0.7 10e3/uL    Absolute Basophils 0.0 0.0 - 0.2 10e3/uL    Absolute Immature Granulocytes 0.0 <=0.4 10e3/uL    Absolute NRBCs 0.0 10e3/uL         BP (!) 145/100   Pulse 99   Temp 97.3  F (36.3  C) (Temporal)   Resp 16   Ht 1.727 m (5' 8\")   Wt 57.6 kg (127 lb)   SpO2 99%   BMI 19.31 kg/m    Weight is 127 lbs 0 oz  Body mass index is 19.31 kg/m .    Physical Exam:     ROS: 10 point ROS neg other than the symptoms noted above in the HPI.            Past Medical History:   PAST MEDICAL HISTORY:   Past Medical History:   Diagnosis Date     Alcohol abuse        PAST SURGICAL HISTORY: History reviewed. No pertinent surgical history.    -    -           MENTAL STATUS EXAM:      Constitutional: General appearance of patient:  Appearance:  awake, alert, appeared as age stated, adequate groomed and slightly unkempt  Attitude:  cooperative  Eye Contact:  good  Mood:  chilly   Affect:  congruent   Speech:  clear, coherent normal rate   Psychomotor Behavior:  no evidence of tardive dyskinesia, dystonia, or tics  Thought Process:  logical, linear and goal oriented  Associations:  no loose associations  Thought Content:  no evidence of " psychotic thought and active suicidal ideation present  Denied any active suicidal /homicidation ideation plan intent   Insight:  fair  Judgment:  fair  Oriented to:  time, person, and place  Attention Span and Concentration:  intact  Recent and Remote Memory:  intact  Language:  english with appropriate syntax and vocabulary  Fund of Knowledge: appropriate  Muscle Strength and Tone: normal  Gait and Station: Normal     There are no abnormal or psychotic thoughts, no preoccupations, no overvalued ideas, no rumination, no obsessions, no compulsions, no somatic concerns, no hypochrondriasis, no ideas of reference, and no delusions.  Patient denies homicidal thoughts.   Patient denies suicidal thoughts.  Patient appears to have good judgment and good insight.     Musculoskeletal: Patient shows no abnormalities of motor activity: there is no tremor, no tic, and no dystonia.  There is no apparent muscle atrophy, strength and tone appear normal, and there are no abnormal movements.  Patient has normal gait and stance.    DISCUSSION:         Assessment:       Patient has a biological predisposition with family history positive for alcoholism bipolar affective disorder  Psychologically patient is experiencing abusing alcohol patient has anxiety  Patient has these particular stressors relapse prone  Patient has chronic illness exacerbation leading to hospitalization progression as described.     Patient has been unable to stop using drugs in the community due to both physical and psychological symptoms.  Continued use will put the patient at risk for medical and/or psychiatric complications.      Inpatient psychiatric hospitalization is warranted at this time for safety, stabilization, and possible adjustment in medications.       Diagnoses:    Alcohol use disorder severe  Alcohol withdrawal severe  Generalized anxiety disorder  Nicotine abuse          Plan:   Problem list  1#alcohol use disorder severe alcohol withdrawal  severe     - MSSA protocol using Valium for management of alcohol withdrawal    - Continue thiamine, folate, and multivitamin daily    MSSA    Eating Disturbances: ate and enjoyed all of it or not applicable  Tremor: 4 - moderate with arms extended  Sleep Disturbance: slept through the night or not applicable  Clouding of Sensorium: no evidence  Hallucinations: 0 - none  Quality of Contact: 0 - awareness of examiner and people around him/her  Agitation: 0 - normal activity  Paroxysmal Sweats: 2  Temperature: 99.5 or below  Pulse: 1 - 70 to 79  Total MSSA Score: 8   Patient has a pulse of 99 blood pressure 145/100  Patient received 10 mg of Valium since morning and 50 mg since admission  2#patient be continued on Prozac 40 mg for his generalized anxiety disorder    3#patient has low hemoglobin 12.9 low hematocrit 38.7        - Consider anti-craving medications prior to discharge. Pt willing to review additional information about both naltrexone and Antabuse.    Alcohol withdrawal nausea prn Zofran as needed for nausea     hydroxyzine 25 mg q4h prn for acute anxiety  Trazodone 50 mg at bedtime prn for sleep disturbances       Patient has been unable to stop using drugs in the community due to both physical and psychological symptoms.  Continued use will put the patient at risk for medical and/or psychiatric complications.    I HAVE REVIEWED LABS WITH PT AND TALKED ABOUT RESULTS WITH PT  I HAVE REVIEWED AND SUMMARIZED OLD RECORDS including his medication reconcilation of his home medications  and PDMP   I HAVE SPOKEN WITH RN ABOUT MEDICATIONS AND DETOX SCORES  I HAVE SPOKEN WITH CM ABOUT PTS TREATMENT OPTIONS     Discussed in detail about patient's smoking patient was advised to quit patient was told about the impact of smoking.  Patient's willingness to quit was assessed.  I provided methods and skills for cessation including medication management nicotine gum patch.  Patient did not set a quit date.  Patient is  interested in quitting .we discussed pharmacotherapy options .patient agreed to take nicotine gum patch lozenge.  We discussed behavioral change techniques when craving nicotine including deep breathing drinking glass of water, taking a walk.            Laboratory/Imaging:    Liver Function Studies -   Recent Labs   Lab Test 04/23/22  0019   PROTTOTAL 7.6   ALBUMIN 3.9   BILITOTAL 0.8   ALKPHOS 51   AST 45   ALT 43      Last Comprehensive Metabolic Panel:  Sodium   Date Value Ref Range Status   04/23/2022 138 133 - 144 mmol/L Final   02/27/2021 150 (H) 133 - 144 mmol/L Final     Potassium   Date Value Ref Range Status   04/23/2022 3.5 3.4 - 5.3 mmol/L Final   02/27/2021 3.1 (L) 3.4 - 5.3 mmol/L Final     Chloride   Date Value Ref Range Status   04/23/2022 103 94 - 109 mmol/L Final   02/27/2021 113 (H) 94 - 109 mmol/L Final     Carbon Dioxide   Date Value Ref Range Status   02/27/2021 27 20 - 32 mmol/L Final     Carbon Dioxide (CO2)   Date Value Ref Range Status   04/23/2022 28 20 - 32 mmol/L Final     Anion Gap   Date Value Ref Range Status   04/23/2022 7 3 - 14 mmol/L Final   02/27/2021 10 3 - 14 mmol/L Final     Glucose   Date Value Ref Range Status   04/23/2022 99 70 - 99 mg/dL Final   02/27/2021 85 70 - 99 mg/dL Final     Urea Nitrogen   Date Value Ref Range Status   04/23/2022 13 7 - 30 mg/dL Final   02/27/2021 7 7 - 30 mg/dL Final     Creatinine   Date Value Ref Range Status   04/23/2022 0.79 0.66 - 1.25 mg/dL Final   02/27/2021 1.02 0.66 - 1.25 mg/dL Final     GFR Estimate   Date Value Ref Range Status   04/23/2022 >90 >60 mL/min/1.73m2 Final     Comment:     Effective December 21, 2021 eGFRcr in adults is calculated using the 2021 CKD-EPI creatinine equation which includes age and gender (Jesus adams al., NEJM, DOI: 10.1056/BUDIxc4081443)   02/27/2021 >90 >60 mL/min/[1.73_m2] Final     Comment:     Non  GFR Calc  Starting 12/18/2018, serum creatinine based estimated GFR (eGFR) will be  "  calculated using the Chronic Kidney Disease Epidemiology Collaboration   (CKD-EPI) equation.       Calcium   Date Value Ref Range Status   04/23/2022 8.7 8.5 - 10.1 mg/dL Final   02/27/2021 8.1 (L) 8.5 - 10.1 mg/dL Final     Bilirubin Total   Date Value Ref Range Status   04/23/2022 0.8 0.2 - 1.3 mg/dL Final   02/16/2021 0.4 0.2 - 1.3 mg/dL Final     Alkaline Phosphatase   Date Value Ref Range Status   04/23/2022 51 40 - 150 U/L Final   02/16/2021 58 40 - 150 U/L Final     ALT   Date Value Ref Range Status   04/23/2022 43 0 - 70 U/L Final   02/16/2021 20 0 - 70 U/L Final     AST   Date Value Ref Range Status   04/23/2022 45 0 - 45 U/L Final   02/16/2021 38 0 - 45 U/L Final                   Medical treatment/interventions:  Medical concerns: As above    - Consults: IM consult placed. Appreciate assistance.     Legal Status: Voluntary     Safety Assessment:   Checks: Status 15  Pt has not required locked seclusion or restraints in the past 24 hours to maintain safety, please refer to RN documentation for further details.    The risks, benefits, alternatives and side effects have been discussed and are understood by the patient.       Patient will be treated in therapeutic milieu with appropriate individual and group therapies as described.  Disposition: Pending clinical stabilization. Pt does  appear interested in COMPLETE DETOX AND DO TRT  Length of stay 3-5 days        \"Much or all of the text in this note was generated through the use of Dragon Dictate voice to text software. Errors in spelling or words which appear to be out of contact are unintentional, may be present due having escaped editing\"     "

## 2022-04-23 NOTE — PLAN OF CARE
"Goal Outcome Evaluation:    Plan of Care Reviewed With: patient     Overall Patient Progress: improving       MSSA 4 & 4 not requiring valium this shift.  He reports his anxiety is \"baseline, always anxious.\"  He reports sleep and appetite are adequate. He is interested in treatment and reports that his sister is helping him find a treatment program.  He is working on his CD assessment paperwork and will like to meet with CM to have an assessment.   "

## 2022-04-23 NOTE — CONSULTS
Name: Manjeet Canales     : [birth year]  Date: 22     Time: 1159 pm  Location of patient: [facility]   Location of doctor: [place]    Spoke with: Nurse on duty    HPI:   Patient presents seeking detox from alcohol; last drink 10 pm yesterday. Patient drinks 15 beers or more per day. Denies history of seizures.       HPI  Manjeet Canales is a 26 year old male who presents seeking detox of alcohol.  Patient states he has been drinking up to 15 beers per day for several weeks.  Last drink was 10 PM yesterday.  Overnight he was feeling shaky and threw up multiple times.  Denies the use of other drugs.  He reports that he helps violent shaking, nausea and vomiting, restlessness, sweats but no seizures or DTs.  Today he feels as if his hands and leg are tingling and that there is a tight band around his left leg.  Denies any trauma.  No report of suicidal thoughts or hallucinations.      Plan: Nurse requested that I put admission orders on this patient. Patient has already been seen and evaluated by the ED doctor. Patient just needed admission orders. Patient not seen by me. It was a phone consult fpr orders only

## 2022-04-23 NOTE — PLAN OF CARE
Problem: Alcohol Withdrawal  Goal: Alcohol Withdrawal Symptom Control  Description: .  Outcome: Ongoing, Progressing   Goal Outcome Evaluation:  Continues in detox status on alcohol withdrawal protocol.MSSA scores 8 and 5. Medicated x1 with Valium 10 mg. Appetite good. Fluids encouraged. Offers no complaints of physical discomfort. Denies thoughts of self harm.Slept most of the AM.Will continue to monitor.
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
## 2022-04-23 NOTE — PLAN OF CARE
Problem: Substance Withdrawal  Signs and symptoms of listed problems will be absent or manageable.   SBAR    S = Situation:   Voluntary admit  B  = Background:   Pt admitted for alcohol withdrawal.  Pt reports drinking 15 beers / day for the past 5 months with last drink last night.  He has recently lost a loved one (Dad), but had been drinking prior to that death. Patient reports coming to 3 A on two other occasions prior. Patient states his main support system is his mother and Pushpa, his sister. His job at an [employer] center is a good fit for him and he likes it very much. Getting help for alcohol withdrawal is a hi priority in order to get back to work. Pt states  HX of cocaine, cannabinoids, crack, and a few times using meth.  A  =  Assessment:   Vital Signs: at 0405   /89  P 78  O2 98%  T 97.6  R 16    Notable labs: UTOX; COVID    MSSA scores 7 and 8 this shift.      Alert and oriented X 4. Denies SI, SIB, HI, and HA. Affect, mood, eye contact, speech, concentration, appearance, gait, insight, judgment within normal limits. SZ precautions.      R =   Request or Recommendation:   Alcohol withdrawal monitoring,  to evaluate, case management to see--pt reports     Allergies none  Lactose intorlerant  MSSA protocol with valium  Routine liabs including GGT, TSH with free T4 or T3, BMP, CBC with differential, lipid panel,  Comfort meds including Zofran, imodium, trazodone, viosterol, multi vit, thiamine, folic acid, Maalox, senna nicotine replacement

## 2022-04-23 NOTE — PLAN OF CARE
Problem: Alcohol Withdrawal  Goal: Alcohol Withdrawal Symptom Control  Description: .  4/23/2022 0623 by Tin Haynes, RN  Outcome: Ongoing, Progressing     Problem: Substance Misuse (Alcohol Withdrawal)  Goal: Readiness for Change Identified  Outcome: Ongoing, Progressing   Goal Outcome Evaluation:    Patient slept 4.75 hours, uninterrupted, breathing quietly during the shift. Patient stats no HX of SZ or DT's    MSSA scores 7 and 8.        Precautions: SZ  Code 1  Status 15  Voluntary.

## 2022-04-23 NOTE — TELEPHONE ENCOUNTER
R: WAGNER / Henri / ELAN  00:07 - On call, Luli, accepts for detox. 00:10 - Notified unit. RN will call ED for report. 00:11 - Notified ED via charge RN pager.

## 2022-04-23 NOTE — PHARMACY-ADMISSION MEDICATION HISTORY
Admission Medication History Completed by Pharmacy    See [facility] Admission Navigator for allergy information, preferred outpatient pharmacy, prior to admission medications and immunization status.     Medication History Sources:     Patient    Sure Scripts    Changes made to PTA medication list (reason):    Added:   o Hydroxyzine capsules (per patient and Sure Scripts)    Deleted: None    Changed:   o Trazodone 50mg tablet: Take 1 tablet by mouth nightly as needed for sleep --> Take 1-2 tablets by mouth nightly as needed for sleep (per patient and Sure Scripts)    Additional Information:    Patient was a good historian of his medications and was able to confirm names, doses, and directions.    Patient states that he is taking naltrexone 50mg daily rather than the prescribed 100mg once daily.    Patient states that he was previously taking vitamin D daily but did not get any refills and has yet to purchase his own OTC supply.    Prior to Admission medications    Medication Sig Last Dose Taking? Auth Provider   FLUoxetine (PROZAC) 40 MG capsule Take 40 mg by mouth daily 4/21/2022 Yes Unknown, Entered By History   hydrOXYzine (VISTARIL) 50 MG capsule Take 50 mg by mouth 3 times daily as needed PRN Yes Unknown, Entered By History   naltrexone (DEPADE/REVIA) 50 MG tablet Take 50 mg by mouth daily 4/21/2022 Yes Unknown, Entered By History   ondansetron (ZOFRAN-ODT) 4 MG ODT tab Place 4 mg under the tongue every 8 hours as needed for nausea or vomiting 4/22/2022 Yes Reported, Patient   propranolol (INDERAL) 20 MG tablet Take 20 mg by mouth 2 times daily 4/21/2022 Yes Unknown, Entered By History   traZODone (DESYREL) 50 MG tablet Take  mg by mouth nightly as needed for sleep PRN Yes Unknown, Entered By History   Vitamin D3 (CHOLECALCIFEROL) 25 mcg (1000 units) tablet Take 2 tablets (50 mcg) by mouth daily More than a month Yes Nikhil Elam MD       Date completed: 04/23/22    Medication history completed by: Rama  Jerome Escalante

## 2022-04-23 NOTE — PLAN OF CARE
04/23/22 1800   Group Therapy Session   Group Attendance attended group session   Time Session Began 1645   Time Session Ended 1745   Total Time patient participated (minutes) 60   Total # Attendees 5   Group Type expressive therapy  (art therapy)   Group Topic Covered emotions/expression;disease of addiction/choices in recovery   Group Session Detail holding on and letting go   Patient Response/Contribution organized;cooperative with task;expressed readiness to alter behaviors;discussed personal experience with topic   Patient Response Detail Manjeet presented as calm and pleasant. He engaged in group discussion about goals, stating he wants to make his family proud. He participated in making art depicting what he wants to hold on to and let go of in order to move forward with recovery. He shared with the group and talked about needing to let go of difficult emotions and stop using alcohol to cope with anxiety. He interacted appropriately with peers and was a positive participant.   Goal Outcome Evaluation:

## 2022-04-23 NOTE — PROGRESS NOTES
Case Management:     Writer checked-in and introduced role to pt's care and how to assist pt during their stay. Inquired with pt about what they are needing during their stay and what they are considering for their aftercare plans. Pt identified what he is wanting in terms in aftercare reporting that he want's outpatient treatment. Pt discussed his ambivalence around doing LP or a residential stay due to finding a job that he want's to keep. Pt identified that he is currently living with his grandma and that his primary transportation is the bus. Inquired about any particular outpatient programs pt is interested in. Pt identified that he is interested in the  IOP program or other programs that he is able to access via public transportation. Pt reports open to in-person or virtual IOP programs. Directed pt to complete the assessment paper work, pt was agreeable to completing the paperwork to get the assessment completed for tomorrow.     Luana Bob, ELENITA, LPC

## 2022-04-24 LAB
ALBUMIN SERPL-MCNC: 3.6 G/DL (ref 3.4–5)
ALP SERPL-CCNC: 49 U/L (ref 40–150)
ALT SERPL W P-5'-P-CCNC: 40 U/L (ref 0–70)
ANION GAP SERPL CALCULATED.3IONS-SCNC: 6 MMOL/L (ref 3–14)
AST SERPL W P-5'-P-CCNC: 41 U/L (ref 0–45)
BASOPHILS # BLD AUTO: 0 10E3/UL (ref 0–0.2)
BASOPHILS NFR BLD AUTO: 0 %
BILIRUB SERPL-MCNC: 0.7 MG/DL (ref 0.2–1.3)
BUN SERPL-MCNC: 12 MG/DL (ref 7–30)
CALCIUM SERPL-MCNC: 9 MG/DL (ref 8.5–10.1)
CHLORIDE BLD-SCNC: 106 MMOL/L (ref 94–109)
CHOLEST SERPL-MCNC: 247 MG/DL
CO2 SERPL-SCNC: 27 MMOL/L (ref 20–32)
CREAT SERPL-MCNC: 0.74 MG/DL (ref 0.66–1.25)
EOSINOPHIL # BLD AUTO: 0 10E3/UL (ref 0–0.7)
EOSINOPHIL NFR BLD AUTO: 1 %
ERYTHROCYTE [DISTWIDTH] IN BLOOD BY AUTOMATED COUNT: 12.9 % (ref 10–15)
GFR SERPL CREATININE-BSD FRML MDRD: >90 ML/MIN/1.73M2
GGT SERPL-CCNC: 51 U/L (ref 0–75)
GLUCOSE BLD-MCNC: 83 MG/DL (ref 70–99)
HCT VFR BLD AUTO: 37.8 % (ref 40–53)
HDLC SERPL-MCNC: 73 MG/DL
HGB BLD-MCNC: 12.2 G/DL (ref 13.3–17.7)
IMM GRANULOCYTES # BLD: 0 10E3/UL
IMM GRANULOCYTES NFR BLD: 0 %
LDLC SERPL CALC-MCNC: 144 MG/DL
LYMPHOCYTES # BLD AUTO: 1.8 10E3/UL (ref 0.8–5.3)
LYMPHOCYTES NFR BLD AUTO: 41 %
MCH RBC QN AUTO: 27.2 PG (ref 26.5–33)
MCHC RBC AUTO-ENTMCNC: 32.3 G/DL (ref 31.5–36.5)
MCV RBC AUTO: 84 FL (ref 78–100)
MONOCYTES # BLD AUTO: 0.5 10E3/UL (ref 0–1.3)
MONOCYTES NFR BLD AUTO: 11 %
NEUTROPHILS # BLD AUTO: 2 10E3/UL (ref 1.6–8.3)
NEUTROPHILS NFR BLD AUTO: 47 %
NONHDLC SERPL-MCNC: 174 MG/DL
NRBC # BLD AUTO: 0 10E3/UL
NRBC BLD AUTO-RTO: 0 /100
PLATELET # BLD AUTO: 143 10E3/UL (ref 150–450)
POTASSIUM BLD-SCNC: 3.8 MMOL/L (ref 3.4–5.3)
PROT SERPL-MCNC: 7.2 G/DL (ref 6.8–8.8)
RBC # BLD AUTO: 4.48 10E6/UL (ref 4.4–5.9)
SODIUM SERPL-SCNC: 139 MMOL/L (ref 133–144)
TRIGL SERPL-MCNC: 149 MG/DL
TSH SERPL DL<=0.005 MIU/L-ACNC: 1.63 MU/L (ref 0.4–4)
WBC # BLD AUTO: 4.4 10E3/UL (ref 4–11)

## 2022-04-24 PROCEDURE — 128N000004 HC R&B CD ADULT

## 2022-04-24 PROCEDURE — 250N000013 HC RX MED GY IP 250 OP 250 PS 637: Performed by: PSYCHIATRY & NEUROLOGY

## 2022-04-24 PROCEDURE — 80061 LIPID PANEL: CPT | Performed by: PSYCHIATRY & NEUROLOGY

## 2022-04-24 PROCEDURE — 85025 COMPLETE CBC W/AUTO DIFF WBC: CPT | Performed by: PSYCHIATRY & NEUROLOGY

## 2022-04-24 PROCEDURE — 80053 COMPREHEN METABOLIC PANEL: CPT | Performed by: PSYCHIATRY & NEUROLOGY

## 2022-04-24 PROCEDURE — 82977 ASSAY OF GGT: CPT | Performed by: PSYCHIATRY & NEUROLOGY

## 2022-04-24 PROCEDURE — H2032 ACTIVITY THERAPY, PER 15 MIN: HCPCS

## 2022-04-24 PROCEDURE — 36415 COLL VENOUS BLD VENIPUNCTURE: CPT | Performed by: PSYCHIATRY & NEUROLOGY

## 2022-04-24 PROCEDURE — 84443 ASSAY THYROID STIM HORMONE: CPT | Performed by: PSYCHIATRY & NEUROLOGY

## 2022-04-24 RX ADMIN — FOLIC ACID 1 MG: 1 TABLET ORAL at 08:39

## 2022-04-24 RX ADMIN — NICOTINE 1 PATCH: 21 PATCH, EXTENDED RELEASE TRANSDERMAL at 08:44

## 2022-04-24 RX ADMIN — MULTIPLE VITAMINS W/ MINERALS TAB 1 TABLET: TAB at 08:39

## 2022-04-24 RX ADMIN — Medication 50 MCG: at 08:39

## 2022-04-24 RX ADMIN — FLUOXETINE HYDROCHLORIDE 40 MG: 20 CAPSULE ORAL at 08:41

## 2022-04-24 RX ADMIN — PROPRANOLOL HYDROCHLORIDE 20 MG: 20 TABLET ORAL at 08:39

## 2022-04-24 RX ADMIN — PROPRANOLOL HYDROCHLORIDE 20 MG: 20 TABLET ORAL at 22:02

## 2022-04-24 RX ADMIN — TRAZODONE HYDROCHLORIDE 50 MG: 50 TABLET ORAL at 22:02

## 2022-04-24 RX ADMIN — THIAMINE HCL TAB 100 MG 100 MG: 100 TAB at 08:39

## 2022-04-24 RX ADMIN — ALUMINUM HYDROXIDE, MAGNESIUM HYDROXIDE, AND SIMETHICONE 30 ML: 200; 200; 20 SUSPENSION ORAL at 15:25

## 2022-04-24 ASSESSMENT — ACTIVITIES OF DAILY LIVING (ADL)
ORAL_HYGIENE: INDEPENDENT
HYGIENE/GROOMING: INDEPENDENT
DRESS: STREET CLOTHES;INDEPENDENT

## 2022-04-24 ASSESSMENT — PATIENT HEALTH QUESTIONNAIRE - PHQ9: SUM OF ALL RESPONSES TO PHQ QUESTIONS 1-9: 15

## 2022-04-24 NOTE — PROGRESS NOTES
04/24/22 1800   Group Therapy Session   Group Attendance attended group session   Time Session Began 1645   Time Session Ended 1735   Total Time patient participated (minutes) 40   Total # Attendees 5   Group Type recreation   Group Topic Covered coping skills/lifestyle management   Group Session Detail healthy coping skills   Patient Response/Contribution cooperative with task;discussed personal experience with topic   Patient Response Detail Pt was focused on the written portion for the first part of group and then contributed to the group discussion following. Pt discussed several recreational interests and positive coping skills that are healthy outlets. Pt mood was calm and was appropriate with interactions.

## 2022-04-24 NOTE — PLAN OF CARE
Problem: Alcohol Withdrawal  Goal: Alcohol Withdrawal Symptom Control  Description: .  Outcome: Adequate for Care Transition   Goal Outcome Evaluation:      MSSA score 4 this AM. Appetite good. Fluids encouraged. Denies thoughts of self harm. Offers no complaints of physical discomfort.Social with peers and staff on unit. Patient has not required medication for alcohol withdrawal for more than 24 hours. Removed from detox status.

## 2022-04-24 NOTE — PROGRESS NOTES
"[facility]  UNIVERSAL ADULT DIAGNOSTIC ASSESSMENT - Substance Use Disorder    Provider Name and Credentials: Luana Bob, ELENITA, LPC    PATIENT'S NAME: Manjeet Canales  PREFERRED NAME: Manjeet  PRONOUNS: he/him/his     MRN: [id number]  : 1995   Last 4 SSN: [id number]  ACCT. NUMBER:  [id number]  DATE OF SERVICE: 2022   START TIME: 1300  END TIME: 1530  PREFERRED PHONE: [phone]   May we leave a program related message: Yes  SERVICE MODALITY:  In-person      Identifying Information:  Patient is a 26 year old,  male who was referred for an assessment by [facility] and [facility]. The pronoun use throughout this assessment reflects the patient's chosen pronoun. Patient attended the session alone.     Chief Complaint:   The reason for seeking services at this time is: \"Substance Abuse\"  The problem(s) began starting at age 22 due to it being legal age to drink and easy to access. Patient has attempted to resolve these concerns in the past through Inpatient stay at [facility] 2021. Pt also reports a history of trying AA to help with sobriiety.  Patient is in active withdrawal, but is currently admitted to [facility] for medical detoxification and withdrawal monitoring and is not an imminent safety risk to self or others, and may proceed with the assessment interview    Social/Family History:  Patient reported he grew up in [place], [place]. Patient was raised by his biological mother and step father, pt reports that his biological father  prior to his birthPatient reported that his childhood was Good, loving home and felt supported by his parents. Pt reports that he was a rebilous teenager a.  Patient describes current relationships with family of origin as positive. Pt reports that him and his mom are close and same with his older sister. Pt reports that his relationship with younger sister is on and off.      The patient " describes his cultural background as  and non-Amish.  Cultural influences and impact on patient's life structure, values, norms, and healthcare: ..  Contextual influences on patient's health include: Individual Factors ..  Patient identified his preferred language to be English. Patient reported he does not need the assistance of an  or other support involved in therapy.     Patient reports he is not involved in community of cassy activities. Patients reports spirituality impacts his recovery in the following ways:  Pt reports that as of know isn't really exploring spirituality and is still trying to figure that out. .     Patient reported had no significant delays in developmental tasks.  Patient's highest education level was high school graduate. Patient identified the following learning problems: concentration.  Patient reports he is able to understand written materials.    Patient reported the following relationship history reporting two serious relationship's in the past. Pt reports that his drinking has contributed to relationship issues. Patient's current relationship status is single.   Patient identified his sexual orientation as straight.  Patient reported having zero child(amanda).     Patient's current living/housing situation involves staying with grandmother.  Patient lives with his grandmother and he reports that housing is stable. Pt report's that his grandmother is concerned with his use and Is okay living with her as long as he holds down a job and is responsible. Patient identified mother and sister, grandparents other family members as part of his support system.  Patient identified the quality of these relationships as stable and meaningful.      Patient reports engaging in the following recreational/leisure activities: Reading, skate board, roller blading. Patient is currently employed full time and reports they are able to function appropriately at work.Pt reports that he  is ful ltime at [employer] and processed that this is his first job where he hasn't drank on the job  Patient reports his income is obtained through employment.  Patient does identify finances as a current stressor.      Patient denies substance related arrests or legal issues.  Patient denies being on probation / parole / under the jurisdiction of the court.    Patient's Strengths and Limitations:  Patient identified the following strengths or resources that will help him succeed in treatment: family support, motivation. Things that may interfere with the patient's success in treatment include: if he lost housing..     Personal and Family Medical History:   Patient did not report a family history of mental health concerns.  Patient reports the following family history: NA  History reviewed. No pertinent family history.     Patient reported the following previous mental health diagnoses: None.  Patient reports their primary mental health symptoms include:  Depression symptoms like hopeless, low self-worth and these do not impact his ability to function.   Patient has not received mental health services in the past.  Psychiatric Hospitalizations: None.  Patient denies a history of civil commitment.  Current mental health services/providers include:  None.    GAIN-SS:  GAIN-SS Tool: GAIN-SS Tool:  When was the last time that you had significant problems   a. with feeling very trapped, lonely, sad, blue, depressed or hopeless about the future? Past Month  b. with sleep trouble, such as bad dreams, sleeping restlessly, or falling asleep during the day? Past Month  c. with feeling very anxious, nervous, tense, scared, panicked or like something bad was going to happen?  Past Month  d. with becoming very distressed and upset when something reminded you of the past?  2 - 12 months ago  e. with thinking about ending your life or committing suicide?  Never  When was the last time that you did the following things two or more  times?  a. Lied or conned to get things you wanted or to avoid having to do something?   2 - 12 months ago  b. Had a hard time paying attention at school, work or home? Past Month  c. Had a hard time listening to instructions at school, work or home?  Past Month  d. Were a bully or threatened other people?  Never  e. Started physical fights with other people?  Never  Note: These questions are from the Global Appraisal of Individual Needs - Short Screener.  Any item marked  past month  or  2 to 12 months ago  will be scored with a severity rating of at least 2.  For each items that has occurred in the past month or past year ask follow up questions to determine how often the person has felt this way or has the behavior occurred?  How recently? How has it affected their daily living? And, whether they were using or in withdrawal at the time? Pt reports that what he identified is connected to his drinking    No flowsheet data found.No flowsheet data found.    Patient has not had a physical exam to rule out medical causes for current symptoms.  Date of last physical exam was greater than a year ago and client was encouraged to schedule an exam with PCP. The patient has a non-[facility] Primary Care Provider. Their PCP is Nicole Held at [facility] in [place].. Patient reports no current medical concerns.  Patient denies any issues with pain..   Patient denies pregnancy.. There times where there is significant appetite / nutritional concerns. Pt reports that when he is heavily drinking he is not eating appropriatly or healthy. Patient does not report a history of an eating disorder. Patient does not report a history of head injury / trauma / cognitive impairment.      Patient reports current meds as:   Outpatient Medications Marked as Taking for the 4/22/22 encounter (Hospital Encounter)   Medication Sig     FLUoxetine (PROZAC) 40 MG capsule Take 40 mg by mouth daily     hydrOXYzine (VISTARIL) 50 MG capsule  Take 50 mg by mouth 3 times daily as needed     naltrexone (DEPADE/REVIA) 50 MG tablet Take 50 mg by mouth daily     ondansetron (ZOFRAN-ODT) 4 MG ODT tab Place 4 mg under the tongue every 8 hours as needed for nausea or vomiting     propranolol (INDERAL) 20 MG tablet Take 20 mg by mouth 2 times daily     traZODone (DESYREL) 50 MG tablet Take  mg by mouth nightly as needed for sleep     Vitamin D3 (CHOLECALCIFEROL) 25 mcg (1000 units) tablet Take 2 tablets (50 mcg) by mouth daily       Medication Adherence:  Patient reports taking prescribed medications as prescribed.    Patient Allergies:  No Known Allergies    Medical History:    Past Medical History:   Diagnosis Date     Alcohol abuse        Rating Scales:    PHQ9:    PHQ-9 SCORE 4/24/2022   PHQ-9 Total Score 15   ;      Substance Use:  Patient reported the following biological family members or relatives with chemical health issues: Pt reports that his step-father struggled with drinking and was a primary reason that lead to his death. Pt reports that his grandparent's dont' identify as alcoholics but due drink.  Patient has received substance use disorder and/or gambling treatment in the past.  Patient reports the following dates and locations of treatment services:  Inpatient stay at [facility] and AA/NA.  Patient has been to detox.  Patient is not currently receiving any chemical dependency treatment. Patient reports they have attended the following support groups: AA in the past.        Substance Age of first use Pattern and duration of use (include amounts and frequency) Date of last use     Withdrawal potential Route of administration   Has  Alcohol 15 Daily use. Pt reports prior to Covid was drinking a liter of vodka a day. Pt reports in the past year has been drinking daily and would have 15 beers a day 4/21/22  10pm Yes oral   Has  used Marijuana   15 Pt reports heaviest use was in highschool. Pt reports in the past year an infrequent use.   Two weeks ago NA  smoked     Has  used Amphetamines   24 Pt reports a history of using meth twice Two years ago NA  smoked and snorted   Has used Cocaine/ crack    18 Pt reports an infrequent use history. Pt reports that he relapse on Cocaine 3 weeks ago after his step father passed 3 weeks ago NA smoked and snorted   Has used Hallucinogens 18 Pt reports using Acid, Shrooms, DMT, pt reports heaviest use was between [age] on a weekly/mulitple times a week.  Two years ago NA  oral   Has  used Inhalants 24 Pt reports using Whippets twice in his life time Two years ago NA  inhaled   Has not used Heroin NA NA NA NA  NA   Has not used Other Opiates NA NA NA NA  NA   Has not used Benzodiazepine   20 Pt reports using xanax on a recreiational use. Pt reports that his heaviest period was when he was 20 on a weekly basis. Pt reports that he has been given benzos in the hospital to help with withdrawals 3 years ago NA  oral   Has not used Barbiturates NA NA NA NA  NA   Has not used Over the counter meds. NA NA NA NA  NA   Has used Caffeine 16   Pt reports that he drinks coffee and energy drinks in the past. Pt reports he has a cup a coffee occasionally 4/23/22 No oral   Has used Nicotine  14 Pt reports that he started with cigarettes than transitioned to chew and now currently vapes. Pt reports that currently goes through a pod a day 4/22/22 Yes oral and smoked   Has not used other substances not listed above:  Identify:  NA NA NA NA  NA        Patient reported the following problems as a result of their substance use: DUI, financial problems, legal issues, occupational / vocational problems and relationship problems.  Patient is concerned about substance use.     Patient reports experiencing the following withdrawal symptoms within the past 12 months: sweating, shaky/jittery/tremors, unable to sleep, fatigue, sad/depressed feeling, nausea/vomiting, diarrhea, hallucinations, unable to eat and anxiety/worry and the following  within the past 30 days: sweating, shaky/jittery/tremors, unable to sleep, fatigue, sad/depressed feeling, vivid/unpleasant dreams, nausea/vomiting, diminished appetite, unable to eat, fever and anxiety/worry.   Patients reports urges to use Alcohol.  Patient reports he has used more Alcohol than intended and over a longer period of time than intended. Patient reports he has had unsuccessful attempts to cut down or control use of Alcohol.  Patient reports longest period of abstinence was one month and return to use was due to death in the family. Patient reports he has needed to use more Alcohol and Cannabis/ Hashish to achieve the same effect.  Patient does  report diminished effect with use of same amount of Alcohol and Cannabis/ Hashish.     Patient does  report a great deal of time is spent in activities necessary to obtain, use, or recover from Alcohol effects.  Patient does  report important social, occupational, or recreational activities are given up or reduced because of Alcohol and Cannabis/ Hashish use.  Alcohol and Cannabis/ Hashish use is continued despite knowledge of having a persistent or recurrent physical or psychological problem that is likely to have caused or exacerbated by use.  Patient reports the following problem behaviors while under the influence of substances Driving under the influence, unsafe sex. Patient reports his recovery goals are wanting to be successful in life. Pt identieis that he wants to give up drinking but reports till ambivalent about giving up marijuana.     Patient reports substance use has not impacted his ability to function in a school setting. Patient reports substance use has impacted his ability to function in a work setting.  Patients demographics and history impact his recovery in the following ways:. Patient reports the following people are supportive of recovery: Family and temporary sponsor.     Patient does not have a history of gambling concerns and/or  treatment. Patient does not have other addictive behaviors he is concerned about.       Dimension Scale Ratings:    Dimension 1 -  Acute Intoxication/Withdrawal: 1 - Minor Problem Pt is currently hospitalized at [address] Detox unit due to withdrawls to to his alcholo use  Dimension 2 - Biomedical: 0 - No Problem Pt denies any medical concerns and has an established PCP clinican that he see to address his medical concerns  Dimension 3 - Emotional/Behavioral/Cognitive Conditions: 1 - Minor Problem Pt denies any mental health diagnosis. Pt denies a history of SI,SIB, SA or HI. Pt reports struggling with symptoms of depression and coping with recent grief/loss  Dimension 4 - Readiness to Change:  1 - Minor Problem Pt motivated to make changes and for engaging and IOP treatment to address his substance use concerns.   Dimension 5 - Relapse/Continued Use/ Continued Problem Potential: 3 - Severe Problem Pt is at high risk for relapse. Pt has awareness of his relpase potential and understanding of the severity of his use. Pt has some coping skills bur struggles with implenmenting them to maintain sobriety  Dimension 6 - Recovery Environment:  2 - Moderate Problem Pt is engaged in structure and meaningful actvities. Pt has a full time job and supportivie home environment. Pt has a history of DUI but denies any other legal.    Significant Losses / Trauma / Abuse / Neglect Issues:   Patient did not serve in the .  There are indications or report of significant loss, trauma, abuse or neglect issues related to: death of step father.  Concerns for possible neglect are not present.     Safety Assessment:   Current Safety Concerns:  Falls Suicide Severity Rating Scale (Short Version)  Falls Suicide Severity Rating (Short Version) 1/3/2020 2/16/2021 2/27/2021 4/22/2022 4/23/2022   Over the past 2 weeks have you felt down, depressed, or hopeless? yes yes no yes -   Over the past 2 weeks have you had thoughts  of killing yourself? no no no no -   Have you ever attempted to kill yourself? no no no no -   Q1 Wished to be Dead (Past Month) no - - - no   Q2 Suicidal Thoughts (Past Month) no - - - no   Q3 Suicidal Thought Method - - - - no   Q4 Suicidal Intent without Specific Plan - - - - no   Q5 Suicide Intent with Specific Plan - - - - no   Q6 Suicide Behavior (Lifetime) - - - - no   Level of Risk per Screen - - - - low risk     Patient denies current homicidal ideation and behaviors.  Patient denies current self-injurious ideation and behaviors.    Patient reported unsafe motor vehicle operation reported unsafe sex practices  associated with substance use.  Patient denies any high risk behaviors associated with mental health symptoms.  Patient reports the following current concerns for their personal safety: None.  Patient reports there are not firearms in the house. There are no firearms in the home..     History of Safety Concerns:  Patient denied a history of homicidal ideation.     Patient denied a history of personal safety concerns.    Patient denied a history of assaultive behaviors.    Patient denied a history of sexual assault behaviors.     Patient reported a history unsafe motor vehicle operation reported a history of unsafe sex practices  associated with substance use.  Patient denies any history of high risk behaviors associated with mental health symptoms.  Patient reports the following protective factors: positive relationships sober network and positive family connections, forward/future oriented thinking, dedication to family/friends, safe and stable environment, regular sleep, regular physical activity, sense of belonging ., purpose pt reports that he is still finding his purpose but identifies that he has one, help seeking behaviors when distressed ., living with other people, daily obligations, committment to well-being, strong sense of self-worth/esteem and sense of personal control or  determination    Risk Plan:  See Recommendations for Safety and Risk Management Plan    Review of Symptoms per patient report:  Substance Use:  vomiting, daily use and work absence due to substance use     Collateral Contact Summary:   Collateral contacts contributing to this assessment:  Hospital records    If court related records were reviewed, summarize here: NA    Information from collateral contacts supported/largely agreed with information from the client and associated risk ratings.    Information in this assessment was obtained from the medical record and provided by patient who is a good historian.    Patient will have open access to their mental health medical record.    Diagnostic Criteria: 1.) Alcohol/drug is often taken in larger amounts or over a longer period than was intended.  Met for Alcohol.  2.) There is a persistent desire or unsuccessful efforts to cut down or control alcohol/drug use.  Met for Alcohol and Cannabis.  3.) A great deal of time is spent in activities necessary to obtain alcohol, use alcohol, or recover from its effects.  Met for Alcohol.  4.) Craving, or a strong desire or urge to use alcohol/drug.  Met for Alcohol.  5.) Recurrent alcohol/drug use resulting in a failure to fulfill major role obligations at work, school or home.  Met for Alcohol.  7.) Important social, occupational, or recreational activities are given up or reduced because of alcohol/drug use.  Met for Alcohol and Cannabis.  8.) Recurrent alcohol/drug use in situations in which it is physically hazardous.  Met for Alcohol.  10.) Tolerance, as defined by either of the following: A need for markedly increased amounts of alcohol/drug to achieve intoxication or desired effect. and A markedly diminished effect with continued use of the same amount of alcohol/drug..  Met for Alcohol.  11.) Withdrawal, as manifested by either of the following: The characteristic withdrawal syndrome for alcohol/drug (refer to Criteria A  and B of the criteria set for alcohol/drug withdrawal). and Alcohol/drug (or a closely related substance, such as a benzodiazepine) is taken to relieve or avoid withdrawal symptoms.. Met for Alcohol.       As evidenced by self report and criteria, client meets the following DSM5 Diagnoses:   (Sustained by DSM5 Criteria Listed Above)  Alcohol Use Disorder   303.90 (F10.20) Severe .  304.30 (F12.20) Cannabis Use Disorder Moderate  .  Tobacco Use Disorder.  Specify if: In a controlled environment, Specify current severity:  305.1 (F17.200) Severe.    Recommendations:     1. Plan for Safety and Risk Management:  Recommended that patient call 911 or go to the local ED should there be a change in any of these risk factors..      Report to child / adult protection services was NA.     2. [facility]ITRA Referrals:   Recommendations:  Pt is recommended to attend and participate in Intensive Outpatient Program .  Patient reports they are willing to follow these recommendations. Pt reports if he is not successful in IOP program will want to step up to a inpatient program Clinical Substantiation for this recommendation: Pt has a history of participating in Inaptient treatment. Pt does have a supportive home environment and is engaged in a full time job. Pt does engage in AA groups and has a temporary sponsor  Patient would like the following family or other support people involved in their treatment: family. Patient does not have a history of opiate use.    3. Mental Health Referrals:  Pt is recommended to find a individual therapist to work through his grief and loss and any other mental health concerns     4. Patient identified no cultural concerns that need to be addressed in treatment.    5. Recommendations for treatment focus:   Grief / Loss - .  Alcohol / Substance Use - ..          Provider Name/ Credentials:    ELENITA Turner, [facility]                                         April 24, 2022

## 2022-04-24 NOTE — PROGRESS NOTES
Case Management:     Writer checked-in and introduced role to pt's care and how to assist pt during their stay. Inquired with pt about what they are needing during their stay and what they are considering for their aftercare plans.   Pt reports that he completed his assessment packet and identified that he is still wanting to do an IOP program. Writer discussed will work on completing his assessment today and pt was agreeable.

## 2022-04-24 NOTE — PLAN OF CARE
Problem: Alcohol Withdrawal  Goal: Alcohol Withdrawal Symptom Control  Description: .  Outcome: Ongoing, Progressing     Problem: Sleep Disturbance  Goal: Adequate Sleep/Rest  Outcome: Ongoing, Progressing   Goal Outcome Evaluation:           Patient appeared to have slept for 6 hours. MSSA score 4 & 3. No complaint by patient and no psychiatric issues noted. Safety checks done. Will continue to monitor patient.

## 2022-04-25 VITALS
SYSTOLIC BLOOD PRESSURE: 133 MMHG | DIASTOLIC BLOOD PRESSURE: 87 MMHG | WEIGHT: 127 LBS | RESPIRATION RATE: 16 BRPM | HEIGHT: 68 IN | OXYGEN SATURATION: 100 % | HEART RATE: 82 BPM | TEMPERATURE: 97.5 F | BODY MASS INDEX: 19.25 KG/M2

## 2022-04-25 PROCEDURE — 99239 HOSP IP/OBS DSCHRG MGMT >30: CPT | Performed by: PSYCHIATRY & NEUROLOGY

## 2022-04-25 PROCEDURE — 250N000013 HC RX MED GY IP 250 OP 250 PS 637: Performed by: PSYCHIATRY & NEUROLOGY

## 2022-04-25 RX ORDER — FLUOXETINE 40 MG/1
40 CAPSULE ORAL DAILY
Qty: 30 CAPSULE | Refills: 0 | Status: SHIPPED | OUTPATIENT
Start: 2022-04-25

## 2022-04-25 RX ORDER — NICOTINE 21 MG/24HR
1 PATCH, TRANSDERMAL 24 HOURS TRANSDERMAL DAILY
Qty: 30 PATCH | Refills: 0 | Status: SHIPPED | OUTPATIENT
Start: 2022-04-26

## 2022-04-25 RX ORDER — TRAZODONE HYDROCHLORIDE 50 MG/1
50-100 TABLET, FILM COATED ORAL
Qty: 60 TABLET | Refills: 0 | Status: SHIPPED | OUTPATIENT
Start: 2022-04-25

## 2022-04-25 RX ORDER — HYDROXYZINE HYDROCHLORIDE 25 MG/1
25 TABLET, FILM COATED ORAL EVERY 4 HOURS PRN
Qty: 30 TABLET | Refills: 0 | Status: SHIPPED | OUTPATIENT
Start: 2022-04-25

## 2022-04-25 RX ORDER — DISULFIRAM 250 MG/1
250 TABLET ORAL DAILY
Qty: 30 TABLET | Refills: 0 | Status: SHIPPED | OUTPATIENT
Start: 2022-04-25

## 2022-04-25 RX ORDER — VITAMIN B COMPLEX
50 TABLET ORAL DAILY
Qty: 90 TABLET | Refills: 0 | Status: SHIPPED | OUTPATIENT
Start: 2022-04-25

## 2022-04-25 RX ORDER — PROPRANOLOL HYDROCHLORIDE 20 MG/1
20 TABLET ORAL 2 TIMES DAILY
Qty: 60 TABLET | Refills: 0 | Status: SHIPPED | OUTPATIENT
Start: 2022-04-25

## 2022-04-25 RX ORDER — MULTIPLE VITAMINS W/ MINERALS TAB 9MG-400MCG
1 TAB ORAL DAILY
Qty: 30 TABLET | Refills: 0 | Status: SHIPPED | OUTPATIENT
Start: 2022-04-26

## 2022-04-25 RX ORDER — LANOLIN ALCOHOL/MO/W.PET/CERES
100 CREAM (GRAM) TOPICAL DAILY
Qty: 30 TABLET | Refills: 0 | Status: SHIPPED | OUTPATIENT
Start: 2022-04-26

## 2022-04-25 RX ORDER — DISULFIRAM 250 MG/1
250 TABLET ORAL DAILY
Status: DISCONTINUED | OUTPATIENT
Start: 2022-04-25 | End: 2022-04-25 | Stop reason: HOSPADM

## 2022-04-25 RX ORDER — FOLIC ACID 1 MG/1
1 TABLET ORAL DAILY
Qty: 30 TABLET | Refills: 0 | Status: SHIPPED | OUTPATIENT
Start: 2022-04-26

## 2022-04-25 RX ADMIN — FOLIC ACID 1 MG: 1 TABLET ORAL at 08:08

## 2022-04-25 RX ADMIN — DISULFIRAM 250 MG: 250 TABLET ORAL at 08:33

## 2022-04-25 RX ADMIN — PROPRANOLOL HYDROCHLORIDE 20 MG: 20 TABLET ORAL at 08:08

## 2022-04-25 RX ADMIN — Medication 50 MCG: at 08:08

## 2022-04-25 RX ADMIN — MULTIPLE VITAMINS W/ MINERALS TAB 1 TABLET: TAB at 08:08

## 2022-04-25 RX ADMIN — FLUOXETINE HYDROCHLORIDE 40 MG: 20 CAPSULE ORAL at 08:08

## 2022-04-25 RX ADMIN — THIAMINE HCL TAB 100 MG 100 MG: 100 TAB at 08:08

## 2022-04-25 RX ADMIN — NICOTINE 1 PATCH: 21 PATCH, EXTENDED RELEASE TRANSDERMAL at 08:09

## 2022-04-25 NOTE — PROGRESS NOTES
Case Management Note  4/25/2022    Met with pt and reviewed IOP options in his area. Pt opted to be referred to [facility] in [place] and [facility] in [place]. Pt signed ROIs and assessment was faxed to these programs. Pt will f/u with programs directly to schedule. Pt given info on [employer]. Pt declined need for any further resources. AVS updated.     Maria Antonia Agudelo, LPCC, LADC

## 2022-04-25 NOTE — PLAN OF CARE
Goal Outcome Evaluation:    Plan of Care Reviewed With: patient     Overall Patient Progress: improving     Pt is out of detox for alcohol.  He has been visible on the unit, reading, attending groups and social with peers.  Denies depression and continue to have some baseline anxiety.  He is hopeful to discharge tomorrow and denies any needs or concerns.

## 2022-04-25 NOTE — DISCHARGE INSTRUCTIONS
Behavioral [place] Discharge Planning and Instructions  THANK YOU FOR CHOOSING THE [facility]  [place] [address] [place]: [phone]    Summary: You were admitted to, then processed through, [place] [facility] on 4/22/2022 for detoxification from alcohol.  A medical examination was performed that included lab work. You have met with a  and opted to pursue IOP treatment at either [facility] or [facility]. Please make your recovery a priority, Mr. Canales.     Main Diagnosis:  Alcohol Dependence: COMPLICATED    Major Treatments, Procedures and Findings:  You were detoxified from alcohol using the appropriate protocol(s). You have had a chemical dependency assessment.  You have had blood drawn, and the results have been reviewed with you.  Please take a copy of your laboratory work to appointment.    Symptoms to Report:  If you experience more anxiety, confusion, sleeplessness, deep sadness or thoughts of suicide, notify your treatment team or notify your primary care physician. IF THE SYMPTOMS YOU ARE EXPERIENCING ARE A MEDICAL EMERGENCY, CALL 911 IMMEDIATELY.     Lifestyle Adjustment: Adjust your lifestyle to get enough sleep, relaxation, exercise and excellent nutrition. Continue to develop healthy coping skills to decrease stress and promote a sober living environment. Do NOT use alcohol, illegal drugs or addictive medications other than what is currently prescribed. AA, NA, and a sponsor are excellent resources for support.     Primary Provider: Patient states that he prefers to follow up with Dr. Letty Meyer of [facility], once discharged from this facility.    Treatment Follow-Up: You have been referred to the following two programs. Please follow up with them directly to coordinate a start date:   [address] [address] [address], [place], [place] [address] # [phone]   Fax: [phone]    [facility]  [address], [place] [address]    # [phone]  Fax: [phone]  About: [facility] ([facility]) is a behavioral health organization dedicated to helping people with mental health and substance use disorders Move Forward in [facility] the [place]. [facility] provides Residential and Intensive Outpatient Co-Occurring Disorders Treatment, Individual and Intensive Outpatient (IOP) mental health counseling services, chemical dependency treatment, and sober housing.      Resources:  [facility] [phone] Support Group:  AA/NA and Sponsor/support.  Crisis Intervention: 386.733.8847 or 410-494-3201. Call anytime for help.  [place] on Mental [place]ness (www.[place].kaya.org): 793.361.6059 or 398-514-3244.  Alcoholics Anonymous (www.alcoholics-anonymous.org): Check your phone book for your local chapter.  Suicide Awareness Voices of Education (www.[employer].org): 412.460.6678  National Suicide Prevention Line (www.mentalhealthmn.org): 350.262.1538  Mental Health Consumer/Survivor Network of [place] (www.mhcsn.net): 505.813.2814 or 881-074-5959.  Mental Health Association of [place] (www.mentalhealth.org): 713.593.6917 or 204-342-8558  Substance Abuse and Mental Health Services. (www.samhsa.gov)    [place] [facility] ([facility])  [facility] connects people seeking recovery to resources that help foster and sustain long-term recovery.  Whether you are seeking resources for treatment, transportation, housing, job training, education, health care or other pathways to recovery, [facility] is a great place to start. 245.254.9766 www.[facility].org    General Medication Instruction: See your medication papers for instructions. Take all medicines as directed.  Make no changes unless your doctor suggests them. Go to all your doctor visits.  Be sure to have all your required lab tests. This way, your medicines may be refilled on time. Do not use any drugs not prescribed by your provider. AA/NA and sponsors are excellent resources for support. Avoid alcohol at all  costs!    Please Note:  If you have any questions at anytime after you are discharged please call the [facility], [place] detoxification serna 3[facility] at [phone].  Formerly [facility], Behavioral Intake [phone]. Please take this discharge folder with you to all your follow up appointments, it contains your lab results, diagnosis, medication list and discharge recommendations.    THANK YOU FOR CHOOSING THE [facility]

## 2022-04-25 NOTE — PLAN OF CARE
Goal Outcome Evaluation:  Patient has experienced a largely positive day on [facility] 3A.  He reports absence of suicidal ideation while on saldana.  He has thus far been pleasant of disposition, and behaviorally appropriate.  He looks forward to his imminent discharge, at some juncture later today.  Will continue to monitor as prudent.

## 2022-04-25 NOTE — PROGRESS NOTES
PDMP checked. Pt found, but no applicable prescriptions found within the past year.   PT SEEN   AGREE WITH ASSESSMENT AND PLAN

## 2022-04-25 NOTE — PLAN OF CARE
Behavioral Team Discussion: (4/25/2022)    Continued Stay Criteria/Rationale: Patient admitted for alcohol withdrawal, complicated.  Plan: The following services will be provided to the patient; psychiatric assessment, medication management, therapeutic milieu, individual and group support, and skills groups.   Participants: 3A Provider: Dr. Javier Álvarez MD; 3A RN: Mathew Kemp RN; 3A CM's: Maria Antonia Agudelo.  Summary/Recommendation: Providers will assess today for treatment recommendations, discharge planning, and aftercare plans. CM will meet with pt for discharge planning.   Medical/Physical: Internal medicine consult completed 4/23/2022.  Precautions:   Behavioral Orders   Procedures     Code 1 - Restrict to Unit     Routine Programming     As clinically indicated     Seizure precautions     Seizure precautions     Status 15     Every 15 minutes.     Withdrawal precautions     Rationale for change in precautions or plan: N/A  Progress: No Change.
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
## 2022-04-25 NOTE — PROVIDER NOTIFICATION
04/25/22 0606   Sleep/Rest   Night Time # Hours 6.75 hours     Pt is out of detox for alcohol. Pt slept approximately 6.75 hour during the shift. No c/o insomnia.

## 2022-04-26 ENCOUNTER — PATIENT OUTREACH (OUTPATIENT)
Dept: CARE COORDINATION | Facility: CLINIC | Age: 27
End: 2022-04-26
Payer: COMMERCIAL

## 2022-04-26 DIAGNOSIS — Z71.89 OTHER SPECIFIED COUNSELING: ICD-10-CM

## 2022-04-26 NOTE — PROGRESS NOTES
Clinic Care Coordination Contact  [facility]/Voicemail       Clinical Data: Care Coordinator Outreach  Outreach attempted x 1.  Left message on patient's voicemail with call back information and requested return call.  Plan: CC will attempt to reach patient again in 1-2 business days.    BRADY New   Social Work Clinic Care Coordinator   [facility]  PH: [phone]  [email or web address]

## 2022-04-27 NOTE — PROGRESS NOTES
Clinic Care Coordination Contact  [facility]/Voicemail       Clinical Data: Care Coordinator Outreach  Outreach attempted x 2.  Left message on patient's voicemail with call back information and requested return call.  Plan: CC will make no further outreaches at this time.    BRADY New   Social Work Clinic Care Coordinator   [facility]  PH: [phone]  [email or web address]

## 2022-09-18 ENCOUNTER — HEALTH MAINTENANCE LETTER (OUTPATIENT)
Age: 27
End: 2022-09-18

## 2023-05-07 ENCOUNTER — HEALTH MAINTENANCE LETTER (OUTPATIENT)
Age: 28
End: 2023-05-07

## 2024-07-14 ENCOUNTER — HEALTH MAINTENANCE LETTER (OUTPATIENT)
Age: 29
End: 2024-07-14